# Patient Record
Sex: FEMALE | Race: WHITE | NOT HISPANIC OR LATINO | Employment: OTHER | URBAN - METROPOLITAN AREA
[De-identification: names, ages, dates, MRNs, and addresses within clinical notes are randomized per-mention and may not be internally consistent; named-entity substitution may affect disease eponyms.]

---

## 2020-12-16 ENCOUNTER — OFFICE VISIT (OUTPATIENT)
Dept: OBSTETRICS AND GYNECOLOGY | Facility: CLINIC | Age: 75
End: 2020-12-16
Attending: OBSTETRICS & GYNECOLOGY
Payer: MEDICARE

## 2020-12-16 VITALS
HEIGHT: 57 IN | DIASTOLIC BLOOD PRESSURE: 80 MMHG | BODY MASS INDEX: 22.66 KG/M2 | WEIGHT: 105.06 LBS | SYSTOLIC BLOOD PRESSURE: 120 MMHG

## 2020-12-16 DIAGNOSIS — Z00.00 ROUTINE GENERAL MEDICAL EXAMINATION AT A HEALTH CARE FACILITY: Primary | ICD-10-CM

## 2020-12-16 DIAGNOSIS — R30.0 BURNING WITH URINATION: ICD-10-CM

## 2020-12-16 DIAGNOSIS — Z12.11 ENCOUNTER FOR SCREENING COLONOSCOPY: ICD-10-CM

## 2020-12-16 DIAGNOSIS — Z01.419 ENCOUNTER FOR GYNECOLOGICAL EXAMINATION (GENERAL) (ROUTINE) WITHOUT ABNORMAL FINDINGS: ICD-10-CM

## 2020-12-16 PROCEDURE — 1126F AMNT PAIN NOTED NONE PRSNT: CPT | Mod: S$GLB,,, | Performed by: OBSTETRICS & GYNECOLOGY

## 2020-12-16 PROCEDURE — 99999 PR PBB SHADOW E&M-NEW PATIENT-LVL III: CPT | Mod: PBBFAC,,, | Performed by: OBSTETRICS & GYNECOLOGY

## 2020-12-16 PROCEDURE — 99387 PR PREVENTIVE VISIT,NEW,65 & OVER: ICD-10-PCS | Mod: S$GLB,,, | Performed by: OBSTETRICS & GYNECOLOGY

## 2020-12-16 PROCEDURE — 87086 URINE CULTURE/COLONY COUNT: CPT

## 2020-12-16 PROCEDURE — 99999 PR PBB SHADOW E&M-NEW PATIENT-LVL III: ICD-10-PCS | Mod: PBBFAC,,, | Performed by: OBSTETRICS & GYNECOLOGY

## 2020-12-16 PROCEDURE — 1126F PR PAIN SEVERITY QUANTIFIED, NO PAIN PRESENT: ICD-10-PCS | Mod: S$GLB,,, | Performed by: OBSTETRICS & GYNECOLOGY

## 2020-12-16 PROCEDURE — 3288F PR FALLS RISK ASSESSMENT DOCUMENTED: ICD-10-PCS | Mod: CPTII,S$GLB,, | Performed by: OBSTETRICS & GYNECOLOGY

## 2020-12-16 PROCEDURE — 1101F PT FALLS ASSESS-DOCD LE1/YR: CPT | Mod: CPTII,S$GLB,, | Performed by: OBSTETRICS & GYNECOLOGY

## 2020-12-16 PROCEDURE — 99387 INIT PM E/M NEW PAT 65+ YRS: CPT | Mod: S$GLB,,, | Performed by: OBSTETRICS & GYNECOLOGY

## 2020-12-16 PROCEDURE — 3288F FALL RISK ASSESSMENT DOCD: CPT | Mod: CPTII,S$GLB,, | Performed by: OBSTETRICS & GYNECOLOGY

## 2020-12-16 PROCEDURE — 1101F PR PT FALLS ASSESS DOC 0-1 FALLS W/OUT INJ PAST YR: ICD-10-PCS | Mod: CPTII,S$GLB,, | Performed by: OBSTETRICS & GYNECOLOGY

## 2020-12-17 LAB — BACTERIA UR CULT: NO GROWTH

## 2021-01-09 ENCOUNTER — IMMUNIZATION (OUTPATIENT)
Dept: INTERNAL MEDICINE | Facility: CLINIC | Age: 76
End: 2021-01-09
Payer: MEDICARE

## 2021-01-09 DIAGNOSIS — Z23 NEED FOR VACCINATION: ICD-10-CM

## 2021-01-09 PROCEDURE — 91300 COVID-19, MRNA, LNP-S, PF, 30 MCG/0.3 ML DOSE VACCINE: CPT | Mod: PBBFAC | Performed by: FAMILY MEDICINE

## 2021-01-30 ENCOUNTER — IMMUNIZATION (OUTPATIENT)
Dept: INTERNAL MEDICINE | Facility: CLINIC | Age: 76
End: 2021-01-30
Payer: MEDICARE

## 2021-01-30 DIAGNOSIS — Z23 NEED FOR VACCINATION: Primary | ICD-10-CM

## 2021-01-30 PROCEDURE — 0002A COVID-19, MRNA, LNP-S, PF, 30 MCG/0.3 ML DOSE VACCINE: CPT | Mod: PBBFAC | Performed by: FAMILY MEDICINE

## 2021-01-30 PROCEDURE — 91300 COVID-19, MRNA, LNP-S, PF, 30 MCG/0.3 ML DOSE VACCINE: CPT | Mod: PBBFAC | Performed by: FAMILY MEDICINE

## 2021-03-18 ENCOUNTER — PATIENT MESSAGE (OUTPATIENT)
Dept: RESEARCH | Facility: HOSPITAL | Age: 76
End: 2021-03-18

## 2021-03-26 ENCOUNTER — PATIENT MESSAGE (OUTPATIENT)
Dept: RESEARCH | Facility: HOSPITAL | Age: 76
End: 2021-03-26

## 2021-04-15 ENCOUNTER — TELEPHONE (OUTPATIENT)
Dept: ORTHOPEDICS | Facility: CLINIC | Age: 76
End: 2021-04-15

## 2021-04-26 ENCOUNTER — TELEPHONE (OUTPATIENT)
Dept: ORTHOPEDICS | Facility: CLINIC | Age: 76
End: 2021-04-26

## 2021-04-26 ENCOUNTER — PATIENT MESSAGE (OUTPATIENT)
Dept: ORTHOPEDICS | Facility: CLINIC | Age: 76
End: 2021-04-26

## 2021-04-26 DIAGNOSIS — M79.642 LEFT HAND PAIN: Primary | ICD-10-CM

## 2021-04-26 DIAGNOSIS — M79.641 RIGHT HAND PAIN: Primary | ICD-10-CM

## 2021-04-27 ENCOUNTER — HOSPITAL ENCOUNTER (OUTPATIENT)
Dept: RADIOLOGY | Facility: HOSPITAL | Age: 76
Discharge: HOME | End: 2021-04-27
Attending: ORTHOPAEDIC SURGERY
Payer: MEDICARE

## 2021-04-27 ENCOUNTER — OFFICE VISIT (OUTPATIENT)
Dept: ORTHOPEDICS | Facility: CLINIC | Age: 76
End: 2021-04-27
Payer: MEDICARE

## 2021-04-27 VITALS — WEIGHT: 105 LBS | HEIGHT: 57 IN | BODY MASS INDEX: 22.65 KG/M2

## 2021-04-27 DIAGNOSIS — M79.641 RIGHT HAND PAIN: ICD-10-CM

## 2021-04-27 DIAGNOSIS — M65.312 BILATERAL TRIGGER THUMB: Primary | ICD-10-CM

## 2021-04-27 DIAGNOSIS — M65.311 BILATERAL TRIGGER THUMB: Primary | ICD-10-CM

## 2021-04-27 PROCEDURE — 73130 XR HAND COMPLETE 3 VIEWS BILATERAL: ICD-10-PCS | Mod: 26,50,, | Performed by: RADIOLOGY

## 2021-04-27 PROCEDURE — 99203 PR OFFICE/OUTPT VISIT, NEW, LEVL III, 30-44 MIN: ICD-10-PCS | Mod: S$GLB,,, | Performed by: ORTHOPAEDIC SURGERY

## 2021-04-27 PROCEDURE — 1101F PR PT FALLS ASSESS DOC 0-1 FALLS W/OUT INJ PAST YR: ICD-10-PCS | Mod: CPTII,S$GLB,, | Performed by: ORTHOPAEDIC SURGERY

## 2021-04-27 PROCEDURE — 1101F PT FALLS ASSESS-DOCD LE1/YR: CPT | Mod: CPTII,S$GLB,, | Performed by: ORTHOPAEDIC SURGERY

## 2021-04-27 PROCEDURE — 99999 PR PBB SHADOW E&M-EST. PATIENT-LVL II: ICD-10-PCS | Mod: PBBFAC,,, | Performed by: ORTHOPAEDIC SURGERY

## 2021-04-27 PROCEDURE — 1125F AMNT PAIN NOTED PAIN PRSNT: CPT | Mod: S$GLB,,, | Performed by: ORTHOPAEDIC SURGERY

## 2021-04-27 PROCEDURE — 73130 X-RAY EXAM OF HAND: CPT | Mod: TC,50

## 2021-04-27 PROCEDURE — 3288F FALL RISK ASSESSMENT DOCD: CPT | Mod: CPTII,S$GLB,, | Performed by: ORTHOPAEDIC SURGERY

## 2021-04-27 PROCEDURE — 1159F MED LIST DOCD IN RCRD: CPT | Mod: S$GLB,,, | Performed by: ORTHOPAEDIC SURGERY

## 2021-04-27 PROCEDURE — 99999 PR PBB SHADOW E&M-EST. PATIENT-LVL II: CPT | Mod: PBBFAC,,, | Performed by: ORTHOPAEDIC SURGERY

## 2021-04-27 PROCEDURE — 1159F PR MEDICATION LIST DOCUMENTED IN MEDICAL RECORD: ICD-10-PCS | Mod: S$GLB,,, | Performed by: ORTHOPAEDIC SURGERY

## 2021-04-27 PROCEDURE — 1125F PR PAIN SEVERITY QUANTIFIED, PAIN PRESENT: ICD-10-PCS | Mod: S$GLB,,, | Performed by: ORTHOPAEDIC SURGERY

## 2021-04-27 PROCEDURE — 99203 OFFICE O/P NEW LOW 30 MIN: CPT | Mod: S$GLB,,, | Performed by: ORTHOPAEDIC SURGERY

## 2021-04-27 PROCEDURE — 73130 X-RAY EXAM OF HAND: CPT | Mod: 26,50,, | Performed by: RADIOLOGY

## 2021-04-27 PROCEDURE — 3288F PR FALLS RISK ASSESSMENT DOCUMENTED: ICD-10-PCS | Mod: CPTII,S$GLB,, | Performed by: ORTHOPAEDIC SURGERY

## 2024-08-19 ENCOUNTER — TELEPHONE (OUTPATIENT)
Dept: OBSTETRICS AND GYNECOLOGY | Facility: CLINIC | Age: 79
End: 2024-08-19
Payer: MEDICARE

## 2024-08-19 NOTE — TELEPHONE ENCOUNTER
----- Message from Miguelina Holguin sent at 8/19/2024  3:40 PM CDT -----  Type:  Patient Returning Call    Who Called: SELF    Who Left Message for Patient:  MEREDITH FARRELL    Does the patient know what this is regarding?:YES    Would the patient rather a call back or a response via [facility]? CALL    Best Call Back Number:[phone]      Additional Information:

## 2024-08-19 NOTE — TELEPHONE ENCOUNTER
----- Message from Miguelina Holguin sent at 8/19/2024  9:22 AM CDT -----  Type: Patient Call Back    Who called:self    What is the request in detail:scheduling new pt appt    Can the clinic reply by [facility]?no    Would the patient rather a call back or a response via [facility]? call    Best call back number:.[phone] (home)         Additional Information:

## 2024-08-27 ENCOUNTER — OFFICE VISIT (OUTPATIENT)
Dept: OBSTETRICS AND GYNECOLOGY | Facility: CLINIC | Age: 79
End: 2024-08-27
Payer: MEDICARE

## 2024-08-27 VITALS — BODY MASS INDEX: 23.07 KG/M2 | WEIGHT: 106.56 LBS

## 2024-08-27 DIAGNOSIS — N95.0 POST-MENOPAUSE BLEEDING: Primary | ICD-10-CM

## 2024-08-27 PROCEDURE — 99999 PR PBB SHADOW E&M-EST. PATIENT-LVL II: CPT | Mod: PBBFAC,,, | Performed by: OBSTETRICS & GYNECOLOGY

## 2024-08-27 PROCEDURE — 1159F MED LIST DOCD IN RCRD: CPT | Mod: CPTII,S$GLB,, | Performed by: OBSTETRICS & GYNECOLOGY

## 2024-08-27 PROCEDURE — 1101F PT FALLS ASSESS-DOCD LE1/YR: CPT | Mod: CPTII,S$GLB,, | Performed by: OBSTETRICS & GYNECOLOGY

## 2024-08-27 PROCEDURE — 3288F FALL RISK ASSESSMENT DOCD: CPT | Mod: CPTII,S$GLB,, | Performed by: OBSTETRICS & GYNECOLOGY

## 2024-08-27 PROCEDURE — 88175 CYTOPATH C/V AUTO FLUID REDO: CPT | Performed by: OBSTETRICS & GYNECOLOGY

## 2024-08-27 PROCEDURE — 1125F AMNT PAIN NOTED PAIN PRSNT: CPT | Mod: CPTII,S$GLB,, | Performed by: OBSTETRICS & GYNECOLOGY

## 2024-08-27 PROCEDURE — 1160F RVW MEDS BY RX/DR IN RCRD: CPT | Mod: CPTII,S$GLB,, | Performed by: OBSTETRICS & GYNECOLOGY

## 2024-08-27 PROCEDURE — 99204 OFFICE O/P NEW MOD 45 MIN: CPT | Mod: S$GLB,,, | Performed by: OBSTETRICS & GYNECOLOGY

## 2024-08-27 RX ORDER — LATANOPROST 50 UG/ML
SOLUTION/ DROPS OPHTHALMIC
COMMUNITY

## 2024-08-27 NOTE — PROGRESS NOTES
HPI:   79 y.o.   OB History          5    Para   1    Term   1            AB   4    Living   1         SAB   4    IAB        Ectopic        Multiple        Live Births   1              No LMP recorded. Patient is postmenopausal.    New patient, here because of vaginal bleeding recently, lasted about a week, none before or since this last week  No sig pain, but some 'pressure, creamps  Initially thought it was urine, was very light with wiping  But now feels it was vaginal,   No trauma or other new meds, no steroid shots  Had ua with primary , states was negative  No burning with urine    Sign history if bilat mastectomies for cancer,     Gi and gu good  One child    ROS:  GENERAL: No fever, chills, fatigability or weight loss.  SKIN: No rashes, itching or changes in color or texture of skin.  HEAD: No headaches or recent head trauma.  EYES: Visual acuity fine. No photophobia, ocular pain or diplopia.  EARS: Denies ear pain, discharge or vertigo.  NOSE: No loss of smell, no epistaxis or postnasal drip.  MOUTH & THROAT: No hoarseness or change in voice. No excessive gum bleeding.  NODES: Denies swollen glands.  CHEST: Denies HUSSEIN, cyanosis, wheezing, cough and sputum production.  CARDIOVASCULAR: Denies chest pain, PND, orthopnea or reduced exercise tolerance.  ABDOMEN: Appetite fine. No weight loss. Denies diarrhea, abdominal pain, hematemesis or blood in stool.  URINARY: No flank pain, dysuria or hematuria.  PERIPHERAL VASCULAR: No claudication or cyanosis.  MUSCULOSKELETAL: No joint stiffness or swelling. Denies back pain.  NEUROLOGIC: No history of seizures, paralysis, alteration of gait or coordination.    PE:   Wt 48.4 kg (106 lb 9.5 oz)   BMI 23.07 kg/m²   APPEARANCE: Well nourished, well developed, in no acute distress.  NECK: Neck symmetric without masses or thyromegaly.  BREASTS: Symmetrical, no skin changes or visible lesions. No palpable masses, nipple discharge or adenopathy  bilaterally.  ABDOMEN: Flat. Soft. No tenderness or masses. No hepatosplenomegaly. No hernias. No CVA tenderness.  VULVA: No lesions. Normal female genitalia.  URETHRAL MEATUS: Normal size and location, no lesions, no prolapse.  URETHRA: No masses, tenderness, prolapse or scarring.  VAGINA: Moist and well rugated, no discharge, no significant cystocele or rectocele.  CERVIX: No lesions and discharge. PAP done.  UTERUS: Normal size, regular shape, mobile, non-tender, bladder base nontender.  ADNEXA: No masses, tenderness or CDS nodularity.  ANUS PERINEUM: Normal.    PROCEDURES:  Pap smear    Assessment:  Post menopause bleeding, was light gone now  Normal exam  Pap done  Discussed causes of bleeding, at her age  Will order rpt ua in a few weeks, (not today because may show blood from exam)  Also pelvic ultrasound to eval uterus/endometrium  Pt agrees with plan   Mod dec making  Will fu with us and ua when comes in epic  Labs from primary revd

## 2024-08-29 LAB
CLINICAL INFO: NORMAL
DATE OF PREVIOUS PAP: NORMAL
DATE PREVIOUS BX: NO
LMP START DATE: NORMAL
SPECIMEN SOURCE CVX/VAG CYTO: NORMAL

## 2024-09-18 ENCOUNTER — HOSPITAL ENCOUNTER (OUTPATIENT)
Dept: RADIOLOGY | Facility: HOSPITAL | Age: 79
Discharge: HOME | End: 2024-09-18
Attending: OBSTETRICS & GYNECOLOGY
Payer: MEDICARE

## 2024-09-18 DIAGNOSIS — N95.0 POST-MENOPAUSE BLEEDING: ICD-10-CM

## 2024-09-18 PROCEDURE — 76856 US EXAM PELVIC COMPLETE: CPT | Mod: TC

## 2024-09-18 PROCEDURE — 76830 TRANSVAGINAL US NON-OB: CPT | Mod: 26,,, | Performed by: RADIOLOGY

## 2024-09-18 PROCEDURE — 76830 TRANSVAGINAL US NON-OB: CPT | Mod: TC

## 2024-09-18 PROCEDURE — 76856 US EXAM PELVIC COMPLETE: CPT | Mod: 26,,, | Performed by: RADIOLOGY

## 2024-09-19 ENCOUNTER — PATIENT MESSAGE (OUTPATIENT)
Dept: OBSTETRICS AND GYNECOLOGY | Facility: CLINIC | Age: 79
End: 2024-09-19
Payer: MEDICARE

## 2024-09-19 ENCOUNTER — TELEPHONE (OUTPATIENT)
Dept: OBSTETRICS AND GYNECOLOGY | Facility: CLINIC | Age: 79
End: 2024-09-19
Payer: MEDICARE

## 2024-09-19 NOTE — TELEPHONE ENCOUNTER
----- Message from Julieta De Leon sent at 9/19/2024  3:57 PM CDT -----  Regarding: return call  Contact: patient  Type:  Patient Returning Call    Who Called:  patient  Who Left Message for Patient:  Nallely  Does the patient know what this is regarding?:    Best Call Back Number:  [phone] (home)     Additional Information:  Please call patient to advise.  Thanks!

## 2024-09-30 ENCOUNTER — TELEPHONE (OUTPATIENT)
Dept: OBSTETRICS AND GYNECOLOGY | Facility: CLINIC | Age: 79
End: 2024-09-30
Payer: MEDICARE

## 2024-09-30 ENCOUNTER — PROCEDURE VISIT (OUTPATIENT)
Dept: OBSTETRICS AND GYNECOLOGY | Facility: CLINIC | Age: 79
End: 2024-09-30
Payer: MEDICARE

## 2024-09-30 VITALS
BODY MASS INDEX: 23.18 KG/M2 | DIASTOLIC BLOOD PRESSURE: 85 MMHG | SYSTOLIC BLOOD PRESSURE: 134 MMHG | WEIGHT: 107.13 LBS

## 2024-09-30 DIAGNOSIS — N95.0 POST-MENOPAUSAL BLEEDING: Primary | ICD-10-CM

## 2024-09-30 PROCEDURE — 58100 BIOPSY OF UTERUS LINING: CPT | Mod: S$GLB,,, | Performed by: OBSTETRICS & GYNECOLOGY

## 2024-09-30 PROCEDURE — 88305 TISSUE EXAM BY PATHOLOGIST: CPT | Performed by: PATHOLOGY

## 2024-09-30 PROCEDURE — 88341 IMHCHEM/IMCYTCHM EA ADD ANTB: CPT | Performed by: PATHOLOGY

## 2024-09-30 PROCEDURE — 88342 IMHCHEM/IMCYTCHM 1ST ANTB: CPT | Performed by: PATHOLOGY

## 2024-09-30 PROCEDURE — 99499 UNLISTED E&M SERVICE: CPT | Mod: S$GLB,,, | Performed by: OBSTETRICS & GYNECOLOGY

## 2024-09-30 NOTE — TELEPHONE ENCOUNTER
----- Message from Maninder sent at 9/30/2024  8:33 AM CDT -----  .Type:  Needs Medical Advice    Who Called: pt    Would the patient rather a call back or a response via [facility]? Call back  Best Call Back Number: [phone]  Additional Information:     Pt stated she received a call to come in today at 1 pm to see the doctor and its not on her appt schedule and would like a call back to verify

## 2024-09-30 NOTE — PROCEDURES
Endometrial biopsy    Date/Time: 9/30/2024 1:00 PM    Performed by: Wiedemann, Michael A., MD  Authorized by: Wiedemann, Michael A., MD    Consent:     Consent obtained:  Prior to procedure the appropriate consent was completed and verified    Consent given by:  Patient    Patient questions answered: yes      Patient agrees, verbalizes understanding, and wants to proceed: yes      Educational handouts given: no      Instructions and paperwork completed: yes    Indication:     Indications: Post-menopausal bleeding      Chronicity of post-menopausal bleeding:  Recurrent    Progression of post-menopausal bleeding:  Unchanged  Pre-procedure:     Pre-procedure timeout performed: yes    Procedure:     Procedure: endometrial biopsy with Pipelle      Cervix cleaned and prepped: yes      A paracervical block was performed: no      An intracervical block was performed: no      The cervix was dilated: yes      Uterus sounded: yes      Uterus sound depth (cm):  7    Specimen collected: specimen collected and sent to pathology      Patient tolerated procedure well with no complications: yes    Comments:     Procedure comments:  Discussed need for embx  Discussed causes  Consents done  Cx very small os, was able to dilate, sound to 6-7  2 passes with suction pipelle done, very good sample  Damien well

## 2024-10-03 ENCOUNTER — PATIENT MESSAGE (OUTPATIENT)
Dept: OBSTETRICS AND GYNECOLOGY | Facility: CLINIC | Age: 79
End: 2024-10-03
Payer: MEDICARE

## 2024-10-03 ENCOUNTER — TELEPHONE (OUTPATIENT)
Dept: GYNECOLOGIC ONCOLOGY | Facility: CLINIC | Age: 79
End: 2024-10-03
Payer: MEDICARE

## 2024-10-03 LAB
FINAL PATHOLOGIC DIAGNOSIS: NORMAL
GROSS: NORMAL
Lab: NORMAL

## 2024-10-03 NOTE — TELEPHONE ENCOUNTER
Discussed emb results, shows malignancy  Disc  refer to gyn onc  Expect a call  Pt voices understanding

## 2024-10-04 ENCOUNTER — PATIENT MESSAGE (OUTPATIENT)
Dept: OBSTETRICS AND GYNECOLOGY | Facility: CLINIC | Age: 79
End: 2024-10-04
Payer: MEDICARE

## 2024-10-04 ENCOUNTER — TELEPHONE (OUTPATIENT)
Dept: OBSTETRICS AND GYNECOLOGY | Facility: CLINIC | Age: 79
End: 2024-10-04
Payer: MEDICARE

## 2024-10-04 NOTE — TELEPHONE ENCOUNTER
----- Message from Sandra sent at 10/4/2024 11:15 AM CDT -----  Type: General Call Back     Name of Caller:LEROY AYLEEN [[id number]]    Reason for call back?:referral     Best Call Back Number:[phone]    Additional Information: patient states she was referred by the provider to dr. Quiroz. Patient states she was being referred to three different providers but never got the information of the other two. Patient states she would like to get the information of the two other providers if possible as she would like to look into them as well if needed. Please message through [facility] portal with further assistance.

## 2024-10-07 ENCOUNTER — TELEPHONE (OUTPATIENT)
Dept: OBSTETRICS AND GYNECOLOGY | Facility: CLINIC | Age: 79
End: 2024-10-07
Payer: MEDICARE

## 2024-10-10 ENCOUNTER — TELEPHONE (OUTPATIENT)
Dept: GYNECOLOGIC ONCOLOGY | Facility: CLINIC | Age: 79
End: 2024-10-10

## 2024-10-10 ENCOUNTER — LAB VISIT (OUTPATIENT)
Dept: LAB | Facility: HOSPITAL | Age: 79
End: 2024-10-10
Attending: STUDENT IN AN ORGANIZED HEALTH CARE EDUCATION/TRAINING PROGRAM
Payer: MEDICARE

## 2024-10-10 ENCOUNTER — OFFICE VISIT (OUTPATIENT)
Dept: GYNECOLOGIC ONCOLOGY | Facility: CLINIC | Age: 79
End: 2024-10-10
Payer: MEDICARE

## 2024-10-10 VITALS
SYSTOLIC BLOOD PRESSURE: 143 MMHG | HEART RATE: 77 BPM | WEIGHT: 105.81 LBS | DIASTOLIC BLOOD PRESSURE: 76 MMHG | OXYGEN SATURATION: 98 % | TEMPERATURE: 98 F | HEIGHT: 58 IN | BODY MASS INDEX: 22.21 KG/M2

## 2024-10-10 DIAGNOSIS — N95.0 PMB (POSTMENOPAUSAL BLEEDING): ICD-10-CM

## 2024-10-10 DIAGNOSIS — C54.1 ENDOMETRIAL CANCER: Primary | ICD-10-CM

## 2024-10-10 DIAGNOSIS — C54.1 ENDOMETRIAL CANCER DETERMINED BY UTERINE BIOPSY: ICD-10-CM

## 2024-10-10 DIAGNOSIS — R10.9 ABDOMINAL PAIN: ICD-10-CM

## 2024-10-10 LAB
CANCER AG125 SERPL-ACNC: 21 U/ML (ref 0–30)
CREAT SERPL-MCNC: 0.8 MG/DL (ref 0.5–1.4)
EST. GFR  (NO RACE VARIABLE): >60 ML/MIN/1.73 M^2

## 2024-10-10 PROCEDURE — 86304 IMMUNOASSAY TUMOR CA 125: CPT | Performed by: STUDENT IN AN ORGANIZED HEALTH CARE EDUCATION/TRAINING PROGRAM

## 2024-10-10 PROCEDURE — 82565 ASSAY OF CREATININE: CPT | Performed by: STUDENT IN AN ORGANIZED HEALTH CARE EDUCATION/TRAINING PROGRAM

## 2024-10-10 PROCEDURE — 36415 COLL VENOUS BLD VENIPUNCTURE: CPT | Performed by: STUDENT IN AN ORGANIZED HEALTH CARE EDUCATION/TRAINING PROGRAM

## 2024-10-10 PROCEDURE — 99999 PR PBB SHADOW E&M-EST. PATIENT-LVL III: CPT | Mod: PBBFAC,,, | Performed by: STUDENT IN AN ORGANIZED HEALTH CARE EDUCATION/TRAINING PROGRAM

## 2024-10-10 RX ORDER — LIDOCAINE HYDROCHLORIDE 10 MG/ML
1 INJECTION, SOLUTION EPIDURAL; INFILTRATION; INTRACAUDAL; PERINEURAL ONCE
OUTPATIENT
Start: 2024-10-10 | End: 2024-10-10

## 2024-10-10 RX ORDER — HEPARIN SODIUM 5000 [USP'U]/ML
5000 INJECTION, SOLUTION INTRAVENOUS; SUBCUTANEOUS
OUTPATIENT
Start: 2024-10-10 | End: 2024-10-11

## 2024-10-10 NOTE — Clinical Note
Sharon Harris is such a sweet lady. Thanks for sending her to see me. We are planning for surgery in 2 weeks.  Thanks, Bob

## 2024-10-10 NOTE — H&P (VIEW-ONLY)
Referring Provider:  Wiedemann, Michael A., MD  [address] [place] [address]  [place] [address]   Subjective:      Patient ID: Sharon Garcia is a 79 y.o. female.    Chief Complaint: Advice Only (Consult, endometrial cancer)    Problem List Items Addressed This Visit    None  Visit Diagnoses       Endometrial cancer determined by uterine biopsy    -  Primary    Relevant Orders    CANCER ANTIGEN 125    CT Chest Abdomen Pelvis W W/O Contrast (XPD)    Creatinine, serum           HPI Several months of PMB. 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 Here to discuss treatment options.    Review of Systems   Constitutional:  Negative for chills, fatigue and fever.   Respiratory:  Negative for cough and shortness of breath.    Cardiovascular:  Negative for chest pain.   Gastrointestinal:  Negative for abdominal distention, abdominal pain, constipation and diarrhea.   Genitourinary:  Positive for vaginal bleeding. Negative for dysuria and pelvic pain.   Musculoskeletal:  Negative for back pain.   Psychiatric/Behavioral:  Negative for dysphoric mood. The patient is not nervous/anxious.      Past Medical History:   Diagnosis Date    Breast cancer     '91 Left, '93 right; alicia mastectomy, no chemo or radiation; no genetic testing    Osteoporosis       Past Surgical History:   Procedure Laterality Date    MASTECTOMY        Family History   Problem Relation Name Age of Onset    Hypertension Mother      Other (sarcoma) Sister      Ovarian cancer Neg Hx      Uterine cancer Neg Hx      Breast cancer Neg Hx      Colon cancer Neg Hx        Social History     Socioeconomic History    Marital status:         Objective:      Vitals:    10/10/24 0928   BP: (!) 143/76   Pulse: 77   Temp: 98.3 °F (36.8 °C)      Physical Exam  Constitutional:       General: She is not in acute distress.  HENT:      Head: Normocephalic.   Eyes:      Extraocular Movements: Extraocular movements intact.      Conjunctiva/sclera: Conjunctivae normal.  "  Cardiovascular:      Rate and Rhythm: Normal rate.      Pulses: Normal pulses.   Pulmonary:      Effort: Pulmonary effort is normal. No respiratory distress.      Breath sounds: No wheezing.   Abdominal:      General: There is no distension.      Tenderness: There is no abdominal tenderness. There is no guarding or rebound.   Genitourinary:     Comments: External genitalia normal. Vagina normal. Cervix with no visible lesions. Uterus mobile and small.  A female staff member was present for the exam    Musculoskeletal:         General: No deformity.   Neurological:      Mental Status: She is alert and oriented to person, place, and time.   Psychiatric:         Mood and Affect: Mood normal.         Behavior: Behavior normal.         Thought Content: Thought content normal.         No results found for: "WBC", "HGB", "HCT", "MCV", "PLT"     Assessment:       Endometrial cancer determined by uterine biopsy  -     CANCER ANTIGEN 125; Future; Expected date: 10/17/2024  -     CT Chest Abdomen Pelvis W W/O Contrast (XPD); Future; Expected date: 10/17/2024  -     Creatinine, serum; Future; Expected date: 10/17/2024         Plan:       Endometrial cancer: Prior work up showed serous endometrial cancer. I had an extensive conversation with the patient today giving a broad overview of endometrial cancer to include epidemiology, risk factors, clinical features, diagnosis, as well as surgical treatment.  I have recommended robotic-assisted hysterectomy, bilateral salpingo-oophorectomy and sentinel lymph node mapping and biopsy. Discussed role of systematic lymphadenectomy if no mapping.  Based on the data from surgery we will determine whether adjuvant therapy would be recommended. I discussed extensively the risks, benefits, alternatives, and indications of the planned procedure to include the risk of damage to bowel, bladder, ureter, or any other abdominal or pelvic organ as well as the risks of conversion to a laparotomy. " Additionally, she understands the surgical risks of infection, allergic reaction, bleeding possibly severe enough to require blood transfusion, blood clots, and cardiopulmonary complications.  She expresses understanding, was given an opportunity to ask questions. After all questions had been answered she strongly desires to proceed with planned procedure.  Plan for Robotic hysterectomy, BSO, and SLN biopsy  CT CA/P and CA-125  prior to surgery  Return post op for discussion of adjuvant treatment.    As part of the medical decision making process I reviewed the referring provides notes, relevant labs, imaging reports and independently interpreted theTVUS from 9/18/24.    Visit today is associated with current or anticipated ongoing medical care related to this patient's single serious condition/complex condition: endometrial cancer.         Bob Quiroz MD

## 2024-10-10 NOTE — PROGRESS NOTES
Referring Provider:  Wiedemann, Michael A., MD  [address] [place] [address]  [place] [address]   Subjective:      Patient ID: Sharon Garcia is a 79 y.o. female.    Chief Complaint: Advice Only (Consult, endometrial cancer)    Problem List Items Addressed This Visit    None  Visit Diagnoses       Endometrial cancer determined by uterine biopsy    -  Primary    Relevant Orders    CANCER ANTIGEN 125    CT Chest Abdomen Pelvis W W/O Contrast (XPD)    Creatinine, serum           HPI Several months of PMB. 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 Here to discuss treatment options.    Review of Systems   Constitutional:  Negative for chills, fatigue and fever.   Respiratory:  Negative for cough and shortness of breath.    Cardiovascular:  Negative for chest pain.   Gastrointestinal:  Negative for abdominal distention, abdominal pain, constipation and diarrhea.   Genitourinary:  Positive for vaginal bleeding. Negative for dysuria and pelvic pain.   Musculoskeletal:  Negative for back pain.   Psychiatric/Behavioral:  Negative for dysphoric mood. The patient is not nervous/anxious.      Past Medical History:   Diagnosis Date    Breast cancer     '91 Left, '93 right; alicia mastectomy, no chemo or radiation; no genetic testing    Osteoporosis       Past Surgical History:   Procedure Laterality Date    MASTECTOMY        Family History   Problem Relation Name Age of Onset    Hypertension Mother      Other (sarcoma) Sister      Ovarian cancer Neg Hx      Uterine cancer Neg Hx      Breast cancer Neg Hx      Colon cancer Neg Hx        Social History     Socioeconomic History    Marital status:         Objective:      Vitals:    10/10/24 0928   BP: (!) 143/76   Pulse: 77   Temp: 98.3 °F (36.8 °C)      Physical Exam  Constitutional:       General: She is not in acute distress.  HENT:      Head: Normocephalic.   Eyes:      Extraocular Movements: Extraocular movements intact.      Conjunctiva/sclera: Conjunctivae normal.  "  Cardiovascular:      Rate and Rhythm: Normal rate.      Pulses: Normal pulses.   Pulmonary:      Effort: Pulmonary effort is normal. No respiratory distress.      Breath sounds: No wheezing.   Abdominal:      General: There is no distension.      Tenderness: There is no abdominal tenderness. There is no guarding or rebound.   Genitourinary:     Comments: External genitalia normal. Vagina normal. Cervix with no visible lesions. Uterus mobile and small.  A female staff member was present for the exam    Musculoskeletal:         General: No deformity.   Neurological:      Mental Status: She is alert and oriented to person, place, and time.   Psychiatric:         Mood and Affect: Mood normal.         Behavior: Behavior normal.         Thought Content: Thought content normal.         No results found for: "WBC", "HGB", "HCT", "MCV", "PLT"     Assessment:       Endometrial cancer determined by uterine biopsy  -     CANCER ANTIGEN 125; Future; Expected date: 10/17/2024  -     CT Chest Abdomen Pelvis W W/O Contrast (XPD); Future; Expected date: 10/17/2024  -     Creatinine, serum; Future; Expected date: 10/17/2024         Plan:       Endometrial cancer: Prior work up showed serous endometrial cancer. I had an extensive conversation with the patient today giving a broad overview of endometrial cancer to include epidemiology, risk factors, clinical features, diagnosis, as well as surgical treatment.  I have recommended robotic-assisted hysterectomy, bilateral salpingo-oophorectomy and sentinel lymph node mapping and biopsy. Discussed role of systematic lymphadenectomy if no mapping.  Based on the data from surgery we will determine whether adjuvant therapy would be recommended. I discussed extensively the risks, benefits, alternatives, and indications of the planned procedure to include the risk of damage to bowel, bladder, ureter, or any other abdominal or pelvic organ as well as the risks of conversion to a laparotomy. " Additionally, she understands the surgical risks of infection, allergic reaction, bleeding possibly severe enough to require blood transfusion, blood clots, and cardiopulmonary complications.  She expresses understanding, was given an opportunity to ask questions. After all questions had been answered she strongly desires to proceed with planned procedure.  Plan for Robotic hysterectomy, BSO, and SLN biopsy  CT CA/P and CA-125  prior to surgery  Return post op for discussion of adjuvant treatment.    As part of the medical decision making process I reviewed the referring provides notes, relevant labs, imaging reports and independently interpreted theTVUS from 9/18/24.    Visit today is associated with current or anticipated ongoing medical care related to this patient's single serious condition/complex condition: endometrial cancer.         Bob Quiroz MD

## 2024-10-11 ENCOUNTER — HOSPITAL ENCOUNTER (OUTPATIENT)
Dept: RADIOLOGY | Facility: HOSPITAL | Age: 79
Discharge: HOME | End: 2024-10-11
Attending: STUDENT IN AN ORGANIZED HEALTH CARE EDUCATION/TRAINING PROGRAM
Payer: MEDICARE

## 2024-10-11 DIAGNOSIS — C54.1 ENDOMETRIAL CANCER DETERMINED BY UTERINE BIOPSY: ICD-10-CM

## 2024-10-11 PROCEDURE — A9698 NON-RAD CONTRAST MATERIALNOC: HCPCS | Performed by: STUDENT IN AN ORGANIZED HEALTH CARE EDUCATION/TRAINING PROGRAM

## 2024-10-11 PROCEDURE — 71270 CT THORAX DX C-/C+: CPT | Mod: TC

## 2024-10-11 PROCEDURE — 71270 CT THORAX DX C-/C+: CPT | Mod: 26,,, | Performed by: RADIOLOGY

## 2024-10-11 PROCEDURE — 74178 CT ABD&PLV WO CNTR FLWD CNTR: CPT | Mod: 26,,, | Performed by: RADIOLOGY

## 2024-10-11 PROCEDURE — 25500020 PHARM REV CODE 255: Performed by: STUDENT IN AN ORGANIZED HEALTH CARE EDUCATION/TRAINING PROGRAM

## 2024-10-11 PROCEDURE — 74178 CT ABD&PLV WO CNTR FLWD CNTR: CPT | Mod: TC

## 2024-10-11 RX ADMIN — IOHEXOL 75 ML: 350 INJECTION, SOLUTION INTRAVENOUS at 01:10

## 2024-10-11 RX ADMIN — IOHEXOL 1000 ML: 12 SOLUTION ORAL at 12:10

## 2024-10-16 ENCOUNTER — HOSPITAL ENCOUNTER (OUTPATIENT)
Dept: PREADMISSION TESTING | Facility: OTHER | Age: 79
Discharge: HOME | End: 2024-10-16
Attending: STUDENT IN AN ORGANIZED HEALTH CARE EDUCATION/TRAINING PROGRAM
Payer: MEDICARE

## 2024-10-16 ENCOUNTER — ANESTHESIA EVENT (OUTPATIENT)
Dept: SURGERY | Facility: OTHER | Age: 79
End: 2024-10-16
Payer: MEDICARE

## 2024-10-16 VITALS
OXYGEN SATURATION: 97 % | HEIGHT: 58 IN | TEMPERATURE: 98 F | RESPIRATION RATE: 16 BRPM | SYSTOLIC BLOOD PRESSURE: 150 MMHG | HEART RATE: 77 BPM | DIASTOLIC BLOOD PRESSURE: 69 MMHG | BODY MASS INDEX: 22.04 KG/M2 | WEIGHT: 105 LBS

## 2024-10-16 DIAGNOSIS — Z01.818 PREOP TESTING: Primary | ICD-10-CM

## 2024-10-16 LAB
ABO + RH BLD: NORMAL
ANION GAP SERPL CALC-SCNC: 10 MMOL/L (ref 8–16)
BASOPHILS # BLD AUTO: 0.03 K/UL (ref 0–0.2)
BASOPHILS NFR BLD: 0.5 % (ref 0–1.9)
BLD GP AB SCN CELLS X3 SERPL QL: NORMAL
BUN SERPL-MCNC: 12 MG/DL (ref 8–23)
CALCIUM SERPL-MCNC: 9.4 MG/DL (ref 8.7–10.5)
CHLORIDE SERPL-SCNC: 105 MMOL/L (ref 95–110)
CO2 SERPL-SCNC: 28 MMOL/L (ref 23–29)
CREAT SERPL-MCNC: 0.8 MG/DL (ref 0.5–1.4)
DIFFERENTIAL METHOD BLD: NORMAL
EOSINOPHIL # BLD AUTO: 0.2 K/UL (ref 0–0.5)
EOSINOPHIL NFR BLD: 3.5 % (ref 0–8)
ERYTHROCYTE [DISTWIDTH] IN BLOOD BY AUTOMATED COUNT: 13.1 % (ref 11.5–14.5)
EST. GFR  (NO RACE VARIABLE): >60 ML/MIN/1.73 M^2
GLUCOSE SERPL-MCNC: 78 MG/DL (ref 70–110)
HCT VFR BLD AUTO: 43.2 % (ref 37–48.5)
HGB BLD-MCNC: 14.3 G/DL (ref 12–16)
IMM GRANULOCYTES # BLD AUTO: 0.01 K/UL (ref 0–0.04)
IMM GRANULOCYTES NFR BLD AUTO: 0.2 % (ref 0–0.5)
LYMPHOCYTES # BLD AUTO: 1.2 K/UL (ref 1–4.8)
LYMPHOCYTES NFR BLD: 20.8 % (ref 18–48)
MCH RBC QN AUTO: 30.6 PG (ref 27–31)
MCHC RBC AUTO-ENTMCNC: 33.1 G/DL (ref 32–36)
MCV RBC AUTO: 92 FL (ref 82–98)
MONOCYTES # BLD AUTO: 0.6 K/UL (ref 0.3–1)
MONOCYTES NFR BLD: 10.5 % (ref 4–15)
NEUTROPHILS # BLD AUTO: 3.7 K/UL (ref 1.8–7.7)
NEUTROPHILS NFR BLD: 64.5 % (ref 38–73)
NRBC BLD-RTO: 0 /100 WBC
PLATELET # BLD AUTO: 226 K/UL (ref 150–450)
PMV BLD AUTO: 11.1 FL (ref 9.2–12.9)
POTASSIUM SERPL-SCNC: 4.6 MMOL/L (ref 3.5–5.1)
RBC # BLD AUTO: 4.68 M/UL (ref 4–5.4)
SODIUM SERPL-SCNC: 143 MMOL/L (ref 136–145)
SPECIMEN OUTDATE: NORMAL
WBC # BLD AUTO: 5.73 K/UL (ref 3.9–12.7)

## 2024-10-16 PROCEDURE — 36415 COLL VENOUS BLD VENIPUNCTURE: CPT | Performed by: ANESTHESIOLOGY

## 2024-10-16 PROCEDURE — 93005 ELECTROCARDIOGRAM TRACING: CPT

## 2024-10-16 PROCEDURE — 80048 BASIC METABOLIC PNL TOTAL CA: CPT | Performed by: ANESTHESIOLOGY

## 2024-10-16 PROCEDURE — 86900 BLOOD TYPING SEROLOGIC ABO: CPT | Performed by: ANESTHESIOLOGY

## 2024-10-16 PROCEDURE — 86901 BLOOD TYPING SEROLOGIC RH(D): CPT | Performed by: ANESTHESIOLOGY

## 2024-10-16 PROCEDURE — 93010 ELECTROCARDIOGRAM REPORT: CPT | Mod: ,,, | Performed by: INTERNAL MEDICINE

## 2024-10-16 PROCEDURE — 85025 COMPLETE CBC W/AUTO DIFF WBC: CPT | Performed by: ANESTHESIOLOGY

## 2024-10-16 RX ORDER — SODIUM CHLORIDE, SODIUM LACTATE, POTASSIUM CHLORIDE, CALCIUM CHLORIDE 600; 310; 30; 20 MG/100ML; MG/100ML; MG/100ML; MG/100ML
INJECTION, SOLUTION INTRAVENOUS CONTINUOUS
OUTPATIENT
Start: 2024-10-16

## 2024-10-16 RX ORDER — LIDOCAINE HYDROCHLORIDE 10 MG/ML
0.5 INJECTION, SOLUTION EPIDURAL; INFILTRATION; INTRACAUDAL; PERINEURAL ONCE
OUTPATIENT
Start: 2024-10-16 | End: 2024-10-16

## 2024-10-16 RX ORDER — BUTALB/ACETAMINOPHEN/CAFFEINE 50-325-40
1 TABLET ORAL 2 TIMES DAILY
COMMUNITY

## 2024-10-16 RX ORDER — ZINC GLUCONATE 50 MG
50 TABLET ORAL DAILY
COMMUNITY

## 2024-10-16 NOTE — DISCHARGE INSTRUCTIONS
Information to Prepare you for your Surgery    PRE-ADMIT TESTING   [address]  [place] [address]  [address]   [phone]  - [place]IC[place]    Your surgery has been scheduled at [facility]. We are pleased to have the opportunity to serve you. For Further Information please call [phone].    On the day of surgery please report to Registration on the 1st floor of the [facility].    CONTACT YOUR PHYSICIAN'S OFFICE THE DAY PRIOR TO YOUR SURGERY TO OBTAIN YOUR ARRIVAL TIME.     The evening before surgery do not eat anything after 9 p.m. ( this includes hard candy, chewing gum and mints).  You may only have GATORADE, POWERADE AND WATER  from 9 p.m. until you leave your home.   *DRINK AT LEAST 12 OUNCES THE MORNING OF SURGERY    DO NOT DRINK ANY LIQUIDS ON THE WAY TO THE HOSPITAL.      Why does your anesthesiologist allow you to drink Gatorade/Powerade before surgery?  Gatorade/Powerade helps to increase your comfort before surgery and to decrease your nausea after surgery.   The carbohydrates in Gatorade/Powerade help reduce your body's stress response to surgery.  If you are a diabetic-drink only water prior to surgery.    Outpatient Surgery- May allow 2 adults (18 and older)/ Support Persons (1 being the designated ) for all surgical/procedural patients. A breastfeeding mother will be allowed her infant and 2 adult Support Persons. No one under the age of 18 will be allowed in the building.    MEDICATION INSTRUCTIONS: TAKE medications checked off by the Anesthesiologist on your Medication List.  *Please bring blood pressure medication and diabetes medication in their original bottles the day of surgery.    Angiogram Patients: Take medications as instructed by your physician, including aspirin.     Surgery Patients:  If you take ASPIRIN - Your PHYSICIAN/SURGEON will need to inform you IF/OR when you need to stop taking aspirin prior to your surgery.      Starting the week prior to surgery, do not take any medications containing IBUPROFEN or NSAIDS (Advil, Aleve, BC, Celebrex, Goody's, Ketorolac, Meloxicam, Mobic, Motrin, Naproxen, Toradol, etc).  If you are not sure if you should take a medicine please call your surgeon's office.  You may take Tylenol.    Do Not Wear any make-up (especially eye make-up) to surgery. Please remove any false eyelashes or eyelash extensions. If you arrive the day of surgery with makeup/eyelashes on you will be required to remove prior to surgery. (There is a risk of corneal abrasions if eye makeup/eyelash extensions are not removed)    Leave all valuables at home.   Do Not wear any jewelry or watches, including any metal in body piercings. Jewelry must be removed prior to coming to the hospital.  There is a possibility that rings that are unable to be removed may be cut off if they are on the surgical extremity.    Please remove all hair extensions, wigs, clips and any other metal accessories/ ornaments from your hair.  These items may pose a flammable/fire risk in Surgery and must be removed.    Do not shave your surgical area at least 5 days prior to your surgery. The surgical prep will be performed at the hospital according to Infection Control regulations.    Contact Lens must be removed before surgery. Either do not wear the contact lens or bring a case and solution for storage.  Please bring a container for eyeglasses or dentures as required.  Bring any paperwork your physician has provided, such as consent forms,  history and physicals, doctor's orders, etc.   Bring comfortable clothes that are loose fitting to wear upon discharge. Take into consideration the type of surgery being performed.  Maintain your diet as advised per your physician the day prior to surgery.    Adequate rest the night before surgery is advised.   Park in the Parking lot behind the hospital or in the [employer] [facility] across the street from the  parking lot. Parking is complimentary.  If you will be discharged the same day as your procedure, please arrange for a responsible adult to drive you home or to accompany you if traveling by taxi.   YOU WILL NOT BE PERMITTED TO DRIVE OR TO LEAVE THE HOSPITAL ALONE AFTER SURGERY.   If you are being discharged the same day, it is strongly recommended that you arrange for someone to remain with you for the first 24 hrs following your surgery.    The Surgeon will speak to your family/visitor after your surgery regarding the outcome of your surgery and post op care.  The Surgeon may speak to you after your surgery, but there is a possibility you may not remember the details.  Please check with your family members regarding the conversation with the Surgeon.         Bathing Instructions with Hibiclens:    Shower the evening before and morning of your procedure with Chlorhexidine (Hibiclens)  do not use Chlorhexidine on your face or genitals. Do not get in your eyes.  Wash your face with water and your regular face wash/soap  Use your regular shampoo  Apply Chlorhexidine (Hibiclens) directly on your skin or on a wet washcloth and wash gently. When showering: Move away from the shower stream when applying Chlorhexidine (Hibiclens) to avoid rinsing off too soon.  Rinse thoroughly with warm water  Do not dilute Chlorhexidine (Hibiclens)   Dry off as usual, do not use any deodorant, powder, body lotions, perfume, after shave or cologne.     We strongly recommend whoever is bringing you home be present for discharge instructions.  This will ensure a thorough understanding for your post op home care.      ThanksCarly RN

## 2024-10-16 NOTE — ANESTHESIA PREPROCEDURE EVALUATION
10/16/2024  Sharon Garcia is a 79 y.o., female.      Pre-op Assessment    I have reviewed the Patient Summary Reports.     I have reviewed the Nursing Notes. I have reviewed the NPO Status.   I have reviewed the Medications.     Review of Systems  Anesthesia Hx:  No previous Anesthesia             Denies Family Hx of Anesthesia complications.    Denies Personal Hx of Anesthesia complications.                    Social:  Non-Smoker       Hematology/Oncology:  Hematology Normal                     Current/Recent Cancer.  --  Cancer in past history:       Breast          Oncology Comments: Endometrial cancer now  Prev alicia mast for breast ca     EENT/Dental:  EENT/Dental Normal           Cardiovascular:  Cardiovascular Normal                                              Pulmonary:  Pulmonary Normal                       Renal/:  Renal/ Normal                 Hepatic/GI:  Hepatic/GI Normal                    Musculoskeletal:  Arthritis               Neurological:  Neurology Normal                                      Endocrine:  Endocrine Normal            Dermatological:  Skin Normal    Psych:  Psychiatric Normal                  Physical Exam  General: Cooperative, Alert and Oriented    Airway:  Mallampati: II   Mouth Opening: Normal  Neck ROM: Normal ROM    Dental:  Retainer, Caps / Implants, Intact    Anesthesia Plan  Type of Anesthesia, risks & benefits discussed:    Anesthesia Type: Gen ETT  Intra-op Monitoring Plan: Standard ASA Monitors  Post Op Pain Control Plan: multimodal analgesia  Induction:  IV  Airway Plan: Video  Informed Consent: Informed consent signed with the Patient and all parties understand the risks and agree with anesthesia plan.  All questions answered.   ASA Score: 2  Anesthesia Plan Notes: Labs,EKG, T and S today    Ready For Surgery From Anesthesia Perspective.     .

## 2024-10-17 LAB
OHS QRS DURATION: 76 MS
OHS QTC CALCULATION: 410 MS

## 2024-10-21 ENCOUNTER — TELEPHONE (OUTPATIENT)
Dept: GYNECOLOGIC ONCOLOGY | Facility: CLINIC | Age: 79
End: 2024-10-21
Payer: MEDICARE

## 2024-10-22 ENCOUNTER — TELEPHONE (OUTPATIENT)
Dept: GYNECOLOGIC ONCOLOGY | Facility: CLINIC | Age: 79
End: 2024-10-22
Payer: MEDICARE

## 2024-10-22 NOTE — TELEPHONE ENCOUNTER
----- Message from Keyonna sent at 10/22/2024  2:21 PM CDT -----  Contact: patient  Type:  Patient Call          Who Called: Patient         Does the patient know what this is regarding?: Requesting a call back pt would like to know what time her surgery is scheduled for ;she knows that she should arrive at 6 ; please advise           Would the patient rather a call back or a response via [email or web address]?call           Best Call Back Number: .phone            Additional Information:

## 2024-10-23 ENCOUNTER — HOSPITAL ENCOUNTER (OUTPATIENT)
Facility: OTHER | Age: 79
LOS: 1 days | Discharge: HOME | End: 2024-10-23
Attending: STUDENT IN AN ORGANIZED HEALTH CARE EDUCATION/TRAINING PROGRAM | Admitting: STUDENT IN AN ORGANIZED HEALTH CARE EDUCATION/TRAINING PROGRAM
Payer: MEDICARE

## 2024-10-23 ENCOUNTER — ANESTHESIA (OUTPATIENT)
Dept: SURGERY | Facility: OTHER | Age: 79
End: 2024-10-23
Payer: MEDICARE

## 2024-10-23 VITALS
BODY MASS INDEX: 22.04 KG/M2 | OXYGEN SATURATION: 94 % | HEIGHT: 58 IN | WEIGHT: 105 LBS | TEMPERATURE: 99 F | RESPIRATION RATE: 18 BRPM | HEART RATE: 93 BPM | SYSTOLIC BLOOD PRESSURE: 113 MMHG | DIASTOLIC BLOOD PRESSURE: 65 MMHG

## 2024-10-23 DIAGNOSIS — Z90.710 S/P LAPAROSCOPIC HYSTERECTOMY: Primary | ICD-10-CM

## 2024-10-23 DIAGNOSIS — C54.1 ENDOMETRIAL CANCER: ICD-10-CM

## 2024-10-23 DIAGNOSIS — C54.1 ENDOMETRIAL CANCER DETERMINED BY UTERINE BIOPSY: ICD-10-CM

## 2024-10-23 LAB — POCT GLUCOSE: 77 MG/DL (ref 70–110)

## 2024-10-23 PROCEDURE — 36000712 HC OR TIME LEV V 1ST 15 MIN: Performed by: STUDENT IN AN ORGANIZED HEALTH CARE EDUCATION/TRAINING PROGRAM

## 2024-10-23 PROCEDURE — 88305 TISSUE EXAM BY PATHOLOGIST: CPT | Performed by: STUDENT IN AN ORGANIZED HEALTH CARE EDUCATION/TRAINING PROGRAM

## 2024-10-23 PROCEDURE — 71000039 HC RECOVERY, EACH ADD'L HOUR: Performed by: STUDENT IN AN ORGANIZED HEALTH CARE EDUCATION/TRAINING PROGRAM

## 2024-10-23 PROCEDURE — 71000016 HC POSTOP RECOV ADDL HR: Performed by: STUDENT IN AN ORGANIZED HEALTH CARE EDUCATION/TRAINING PROGRAM

## 2024-10-23 PROCEDURE — 88307 TISSUE EXAM BY PATHOLOGIST: CPT | Mod: 26,,, | Performed by: PATHOLOGY

## 2024-10-23 PROCEDURE — 71000033 HC RECOVERY, INTIAL HOUR: Performed by: STUDENT IN AN ORGANIZED HEALTH CARE EDUCATION/TRAINING PROGRAM

## 2024-10-23 PROCEDURE — 63600175 PHARM REV CODE 636 W HCPCS: Performed by: NURSE ANESTHETIST, CERTIFIED REGISTERED

## 2024-10-23 PROCEDURE — 58575 LAPS TOT HYST RESJ MAL: CPT | Mod: ,,, | Performed by: STUDENT IN AN ORGANIZED HEALTH CARE EDUCATION/TRAINING PROGRAM

## 2024-10-23 PROCEDURE — 88342 IMHCHEM/IMCYTCHM 1ST ANTB: CPT | Mod: 59 | Performed by: PATHOLOGY

## 2024-10-23 PROCEDURE — 88309 TISSUE EXAM BY PATHOLOGIST: CPT | Performed by: PATHOLOGY

## 2024-10-23 PROCEDURE — 38900 IO MAP OF SENT LYMPH NODE: CPT | Mod: 50,,, | Performed by: STUDENT IN AN ORGANIZED HEALTH CARE EDUCATION/TRAINING PROGRAM

## 2024-10-23 PROCEDURE — 88305 TISSUE EXAM BY PATHOLOGIST: CPT | Mod: 26,,, | Performed by: PATHOLOGY

## 2024-10-23 PROCEDURE — 25000003 PHARM REV CODE 250: Performed by: STUDENT IN AN ORGANIZED HEALTH CARE EDUCATION/TRAINING PROGRAM

## 2024-10-23 PROCEDURE — 38900 IO MAP OF SENT LYMPH NODE: CPT | Mod: AS,50,, | Performed by: PHYSICIAN ASSISTANT

## 2024-10-23 PROCEDURE — 25000003 PHARM REV CODE 250: Performed by: NURSE ANESTHETIST, CERTIFIED REGISTERED

## 2024-10-23 PROCEDURE — 58575 LAPS TOT HYST RESJ MAL: CPT | Mod: AS,,, | Performed by: PHYSICIAN ASSISTANT

## 2024-10-23 PROCEDURE — 38570 LAPAROSCOPY LYMPH NODE BIOP: CPT | Mod: AS,51,, | Performed by: PHYSICIAN ASSISTANT

## 2024-10-23 PROCEDURE — 88112 CYTOPATH CELL ENHANCE TECH: CPT | Mod: 26,,, | Performed by: STUDENT IN AN ORGANIZED HEALTH CARE EDUCATION/TRAINING PROGRAM

## 2024-10-23 PROCEDURE — 36000713 HC OR TIME LEV V EA ADD 15 MIN: Performed by: STUDENT IN AN ORGANIZED HEALTH CARE EDUCATION/TRAINING PROGRAM

## 2024-10-23 PROCEDURE — 88341 IMHCHEM/IMCYTCHM EA ADD ANTB: CPT | Mod: 59 | Performed by: PATHOLOGY

## 2024-10-23 PROCEDURE — 88307 TISSUE EXAM BY PATHOLOGIST: CPT | Performed by: PATHOLOGY

## 2024-10-23 PROCEDURE — 63600175 PHARM REV CODE 636 W HCPCS

## 2024-10-23 PROCEDURE — 82962 GLUCOSE BLOOD TEST: CPT | Performed by: STUDENT IN AN ORGANIZED HEALTH CARE EDUCATION/TRAINING PROGRAM

## 2024-10-23 PROCEDURE — 88309 TISSUE EXAM BY PATHOLOGIST: CPT | Mod: 26,,, | Performed by: PATHOLOGY

## 2024-10-23 PROCEDURE — 71000015 HC POSTOP RECOV 1ST HR: Performed by: STUDENT IN AN ORGANIZED HEALTH CARE EDUCATION/TRAINING PROGRAM

## 2024-10-23 PROCEDURE — 25000003 PHARM REV CODE 250: Performed by: ANESTHESIOLOGY

## 2024-10-23 PROCEDURE — 63600175 PHARM REV CODE 636 W HCPCS: Performed by: STUDENT IN AN ORGANIZED HEALTH CARE EDUCATION/TRAINING PROGRAM

## 2024-10-23 PROCEDURE — P9045 ALBUMIN (HUMAN), 5%, 250 ML: HCPCS | Mod: JZ,JG | Performed by: NURSE ANESTHETIST, CERTIFIED REGISTERED

## 2024-10-23 PROCEDURE — 27201423 OPTIME MED/SURG SUP & DEVICES STERILE SUPPLY: Performed by: STUDENT IN AN ORGANIZED HEALTH CARE EDUCATION/TRAINING PROGRAM

## 2024-10-23 PROCEDURE — 88342 IMHCHEM/IMCYTCHM 1ST ANTB: CPT | Mod: 26,,, | Performed by: PATHOLOGY

## 2024-10-23 PROCEDURE — 37000008 HC ANESTHESIA 1ST 15 MINUTES: Performed by: STUDENT IN AN ORGANIZED HEALTH CARE EDUCATION/TRAINING PROGRAM

## 2024-10-23 PROCEDURE — 88341 IMHCHEM/IMCYTCHM EA ADD ANTB: CPT | Mod: 26,,, | Performed by: PATHOLOGY

## 2024-10-23 PROCEDURE — 38570 LAPAROSCOPY LYMPH NODE BIOP: CPT | Mod: 51,,, | Performed by: STUDENT IN AN ORGANIZED HEALTH CARE EDUCATION/TRAINING PROGRAM

## 2024-10-23 PROCEDURE — 88112 CYTOPATH CELL ENHANCE TECH: CPT | Performed by: STUDENT IN AN ORGANIZED HEALTH CARE EDUCATION/TRAINING PROGRAM

## 2024-10-23 PROCEDURE — 88305 TISSUE EXAM BY PATHOLOGIST: CPT | Mod: 26,,, | Performed by: STUDENT IN AN ORGANIZED HEALTH CARE EDUCATION/TRAINING PROGRAM

## 2024-10-23 PROCEDURE — 37000009 HC ANESTHESIA EA ADD 15 MINS: Performed by: STUDENT IN AN ORGANIZED HEALTH CARE EDUCATION/TRAINING PROGRAM

## 2024-10-23 RX ORDER — DIPHENHYDRAMINE HYDROCHLORIDE 50 MG/ML
25 INJECTION INTRAMUSCULAR; INTRAVENOUS EVERY 6 HOURS PRN
Status: DISCONTINUED | OUTPATIENT
Start: 2024-10-23 | End: 2024-10-23 | Stop reason: HOSPADM

## 2024-10-23 RX ORDER — INDOCYANINE GREEN AND WATER 25 MG
KIT INJECTION
Status: DISCONTINUED | OUTPATIENT
Start: 2024-10-23 | End: 2024-10-23 | Stop reason: HOSPADM

## 2024-10-23 RX ORDER — PROCHLORPERAZINE EDISYLATE 5 MG/ML
5 INJECTION INTRAMUSCULAR; INTRAVENOUS EVERY 30 MIN PRN
Status: DISCONTINUED | OUTPATIENT
Start: 2024-10-23 | End: 2024-10-23 | Stop reason: HOSPADM

## 2024-10-23 RX ORDER — ACETAMINOPHEN 500 MG
1000 TABLET ORAL EVERY 6 HOURS
Qty: 120 TABLET | Refills: 1 | Status: SHIPPED | OUTPATIENT
Start: 2024-10-23

## 2024-10-23 RX ORDER — FENTANYL CITRATE 50 UG/ML
INJECTION, SOLUTION INTRAMUSCULAR; INTRAVENOUS
Status: DISCONTINUED | OUTPATIENT
Start: 2024-10-23 | End: 2024-10-23

## 2024-10-23 RX ORDER — CEFAZOLIN SODIUM 1 G/3ML
2 INJECTION, POWDER, FOR SOLUTION INTRAMUSCULAR; INTRAVENOUS
Status: COMPLETED | OUTPATIENT
Start: 2024-10-23 | End: 2024-10-23

## 2024-10-23 RX ORDER — HYDROMORPHONE HYDROCHLORIDE 2 MG/ML
0.4 INJECTION, SOLUTION INTRAMUSCULAR; INTRAVENOUS; SUBCUTANEOUS EVERY 5 MIN PRN
Status: DISCONTINUED | OUTPATIENT
Start: 2024-10-23 | End: 2024-10-23 | Stop reason: HOSPADM

## 2024-10-23 RX ORDER — OXYCODONE HYDROCHLORIDE 5 MG/1
5 TABLET ORAL
Status: DISCONTINUED | OUTPATIENT
Start: 2024-10-23 | End: 2024-10-23 | Stop reason: HOSPADM

## 2024-10-23 RX ORDER — SODIUM CHLORIDE, SODIUM LACTATE, POTASSIUM CHLORIDE, CALCIUM CHLORIDE 600; 310; 30; 20 MG/100ML; MG/100ML; MG/100ML; MG/100ML
INJECTION, SOLUTION INTRAVENOUS CONTINUOUS
Status: DISCONTINUED | OUTPATIENT
Start: 2024-10-23 | End: 2024-10-23 | Stop reason: HOSPADM

## 2024-10-23 RX ORDER — ROCURONIUM BROMIDE 10 MG/ML
INJECTION, SOLUTION INTRAVENOUS
Status: DISCONTINUED | OUTPATIENT
Start: 2024-10-23 | End: 2024-10-23

## 2024-10-23 RX ORDER — ACETAMINOPHEN 500 MG
1000 TABLET ORAL EVERY 6 HOURS PRN
Status: DISCONTINUED | OUTPATIENT
Start: 2024-10-23 | End: 2024-10-23 | Stop reason: HOSPADM

## 2024-10-23 RX ORDER — ONDANSETRON HYDROCHLORIDE 2 MG/ML
4 INJECTION, SOLUTION INTRAVENOUS EVERY 4 HOURS PRN
Status: DISCONTINUED | OUTPATIENT
Start: 2024-10-23 | End: 2024-10-23 | Stop reason: HOSPADM

## 2024-10-23 RX ORDER — ACETAMINOPHEN 10 MG/ML
INJECTION, SOLUTION INTRAVENOUS
Status: DISCONTINUED | OUTPATIENT
Start: 2024-10-23 | End: 2024-10-23

## 2024-10-23 RX ORDER — IBUPROFEN 600 MG/1
600 TABLET ORAL EVERY 6 HOURS
Qty: 60 TABLET | Refills: 1 | Status: SHIPPED | OUTPATIENT
Start: 2024-10-23

## 2024-10-23 RX ORDER — AMOXICILLIN 250 MG
2 CAPSULE ORAL DAILY PRN
Qty: 30 TABLET | Refills: 0 | Status: SHIPPED | OUTPATIENT
Start: 2024-10-23

## 2024-10-23 RX ORDER — DEXAMETHASONE SODIUM PHOSPHATE 4 MG/ML
INJECTION, SOLUTION INTRA-ARTICULAR; INTRALESIONAL; INTRAMUSCULAR; INTRAVENOUS; SOFT TISSUE
Status: DISCONTINUED | OUTPATIENT
Start: 2024-10-23 | End: 2024-10-23

## 2024-10-23 RX ORDER — LIDOCAINE HYDROCHLORIDE 10 MG/ML
1 INJECTION, SOLUTION EPIDURAL; INFILTRATION; INTRACAUDAL; PERINEURAL ONCE
Status: DISCONTINUED | OUTPATIENT
Start: 2024-10-23 | End: 2024-10-23 | Stop reason: HOSPADM

## 2024-10-23 RX ORDER — HYDROMORPHONE HYDROCHLORIDE 2 MG/ML
0.2 INJECTION, SOLUTION INTRAMUSCULAR; INTRAVENOUS; SUBCUTANEOUS
Status: DISCONTINUED | OUTPATIENT
Start: 2024-10-23 | End: 2024-10-23 | Stop reason: HOSPADM

## 2024-10-23 RX ORDER — PROPOFOL 10 MG/ML
VIAL (ML) INTRAVENOUS
Status: DISCONTINUED | OUTPATIENT
Start: 2024-10-23 | End: 2024-10-23

## 2024-10-23 RX ORDER — PHENYLEPHRINE HYDROCHLORIDE 10 MG/ML
INJECTION INTRAVENOUS
Status: DISCONTINUED | OUTPATIENT
Start: 2024-10-23 | End: 2024-10-23

## 2024-10-23 RX ORDER — ALBUMIN HUMAN 50 G/1000ML
SOLUTION INTRAVENOUS CONTINUOUS PRN
Status: DISCONTINUED | OUTPATIENT
Start: 2024-10-23 | End: 2024-10-23

## 2024-10-23 RX ORDER — GLUCAGON 1 MG
1 KIT INJECTION
Status: DISCONTINUED | OUTPATIENT
Start: 2024-10-23 | End: 2024-10-23 | Stop reason: HOSPADM

## 2024-10-23 RX ORDER — OXYCODONE HYDROCHLORIDE 5 MG/1
5 TABLET ORAL EVERY 4 HOURS PRN
Qty: 15 TABLET | Refills: 0 | Status: SHIPPED | OUTPATIENT
Start: 2024-10-23

## 2024-10-23 RX ORDER — OXYCODONE HYDROCHLORIDE 5 MG/1
5 TABLET ORAL EVERY 4 HOURS PRN
Status: DISCONTINUED | OUTPATIENT
Start: 2024-10-23 | End: 2024-10-23 | Stop reason: HOSPADM

## 2024-10-23 RX ORDER — MEPERIDINE HYDROCHLORIDE 25 MG/ML
12.5 INJECTION INTRAMUSCULAR; INTRAVENOUS; SUBCUTANEOUS ONCE AS NEEDED
Status: DISCONTINUED | OUTPATIENT
Start: 2024-10-23 | End: 2024-10-23 | Stop reason: HOSPADM

## 2024-10-23 RX ORDER — KETOROLAC TROMETHAMINE 30 MG/ML
15 INJECTION, SOLUTION INTRAMUSCULAR; INTRAVENOUS EVERY 6 HOURS PRN
Status: DISCONTINUED | OUTPATIENT
Start: 2024-10-23 | End: 2024-10-23 | Stop reason: HOSPADM

## 2024-10-23 RX ORDER — OXYCODONE HYDROCHLORIDE 5 MG/1
15 TABLET ORAL EVERY 4 HOURS PRN
Qty: 15 TABLET | Refills: 0 | Status: SHIPPED | OUTPATIENT
Start: 2024-10-23 | End: 2024-10-23

## 2024-10-23 RX ORDER — SODIUM CHLORIDE 0.9 % (FLUSH) 0.9 %
3 SYRINGE (ML) INJECTION
Status: DISCONTINUED | OUTPATIENT
Start: 2024-10-23 | End: 2024-10-23 | Stop reason: HOSPADM

## 2024-10-23 RX ORDER — HEPARIN SODIUM 5000 [USP'U]/ML
5000 INJECTION, SOLUTION INTRAVENOUS; SUBCUTANEOUS
Status: COMPLETED | OUTPATIENT
Start: 2024-10-23 | End: 2024-10-23

## 2024-10-23 RX ORDER — LIDOCAINE HYDROCHLORIDE 20 MG/ML
INJECTION INTRAVENOUS
Status: DISCONTINUED | OUTPATIENT
Start: 2024-10-23 | End: 2024-10-23

## 2024-10-23 RX ORDER — LIDOCAINE HYDROCHLORIDE 10 MG/ML
0.5 INJECTION, SOLUTION EPIDURAL; INFILTRATION; INTRACAUDAL; PERINEURAL ONCE
Status: DISCONTINUED | OUTPATIENT
Start: 2024-10-23 | End: 2024-10-23 | Stop reason: HOSPADM

## 2024-10-23 RX ADMIN — CEFAZOLIN 2 G: 330 INJECTION, POWDER, FOR SOLUTION INTRAMUSCULAR; INTRAVENOUS at 08:10

## 2024-10-23 RX ADMIN — OXYCODONE HYDROCHLORIDE 5 MG: 5 TABLET ORAL at 10:10

## 2024-10-23 RX ADMIN — ROCURONIUM BROMIDE 20 MG: 10 INJECTION INTRAVENOUS at 08:10

## 2024-10-23 RX ADMIN — KETOROLAC TROMETHAMINE 15 MG: 30 INJECTION, SOLUTION INTRAMUSCULAR; INTRAVENOUS at 10:10

## 2024-10-23 RX ADMIN — PHENYLEPHRINE HYDROCHLORIDE 100 MCG: 10 INJECTION INTRAVENOUS at 08:10

## 2024-10-23 RX ADMIN — FENTANYL CITRATE 50 MCG: 50 INJECTION, SOLUTION INTRAMUSCULAR; INTRAVENOUS at 08:10

## 2024-10-23 RX ADMIN — ACETAMINOPHEN 1000 MG: 10 INJECTION INTRAVENOUS at 10:10

## 2024-10-23 RX ADMIN — ROCURONIUM BROMIDE 20 MG: 10 INJECTION INTRAVENOUS at 09:10

## 2024-10-23 RX ADMIN — SODIUM CHLORIDE: 0.9 INJECTION, SOLUTION INTRAVENOUS at 07:10

## 2024-10-23 RX ADMIN — LIDOCAINE HYDROCHLORIDE 40 MG: 20 INJECTION, SOLUTION INTRAVENOUS at 08:10

## 2024-10-23 RX ADMIN — PROPOFOL 100 MG: 10 INJECTION, EMULSION INTRAVENOUS at 08:10

## 2024-10-23 RX ADMIN — HEPARIN SODIUM 5000 UNITS: 5000 INJECTION INTRAVENOUS; SUBCUTANEOUS at 07:10

## 2024-10-23 RX ADMIN — ALBUMIN (HUMAN) 100 ML/HR: 12.5 SOLUTION INTRAVENOUS at 08:10

## 2024-10-23 RX ADMIN — DEXAMETHASONE SODIUM PHOSPHATE 8 MG: 4 INJECTION, SOLUTION INTRAMUSCULAR; INTRAVENOUS at 08:10

## 2024-10-23 RX ADMIN — ROCURONIUM BROMIDE 50 MG: 10 INJECTION INTRAVENOUS at 08:10

## 2024-10-23 NOTE — OP NOTE
DATE OF PROCEDURE:  10/23/24     SURGEON: Bob Quiroz MD        ASSISTANTS: Dot Lance who served as first assist  MD Pablo Zambrano MD     PREOPERATIVE DIAGNOSES: Serous Endometrial carcinoma     POSTOPERATIVE DIAGNOSES: Serous Endometrial carcinoma     PROCEDURES:  Robotic-assisted laparoscopic hysterectomy and bilateral   salpingo-oophorectomy   Robotic assisted laparoscopic bilateral sentinel lymph node resection  Injection for sentinel lymph node mapping     COMPLICATIONS: None     ESTIMATED BLOOD LOSS: 25 cc     ANESTHESIA: GETA     INTRAOPERATIVE FINDINGS:  Normal appearing uterus and bilateral ovaries. No peritoneal implants. Filmy adhesion from the descending colon to the anterior abdominal wall. Bilateral mapping to obturator lymph nodes. Normal appearing omentum.        PROCEDURE IN DETAIL: Informed consent was obtained and the patient was taken to   the Operating Suite.  General anesthesia was administered.  Once felt to be   adequate, she was placed in dorsal lithotomy position with her arms tucked.  The   abdomen and pelvis were prepped and draped in the usual fashion.  A Torres catheter was placed to gravity drainage and a speculum was placed in the vagina.  The cervix was visualized, grasped with a single-tooth tenaculum. ICG was injected into the cervix bilaterally at 3 and 9 o'clock.  Serial dilation of cervix was performed and a medium VCare manipulator was placed without difficulty.   Attention was turned to the abdominal portion of the procedure. An 8 mm supra umbilical incision was made and a Veress needle was placed in the peritoneal cavity, confirmed by low opening pressure.  Pneumoperitoneum was obtained with carbon dioxide up to 15 mmHg and a robotic trocar was placed through through the incision.  Intraperitoneal placement was confirmed with the camera.    Three additional robotic trocars were placed in the right and left mid clavicular lines and the left mid  axillary line. A 5mm AirSeal port was placed in the right mid axillary line. A filmy adhesion from the descending colon to the anterior abdominal wall was taken down sharply. All ports were placed under direct visualization. Abdominal survey revealed findings as above.  The patient was placed in steep Trendelenburg and the bowel was retracted out of the pelvis.     The robot was docked and instruments were passed in the operative field.  Pelvic washings were obtained.     Attention was turned to the right side. The retroperitoneum was opened parallel to the infundibulopelvic ligament. There paravesical and pararectal spaces were opened and a sentinel lymph node was identified in the obturator lymph node basin. The ureter and obturator nerve were visualized and kept out of the field of dissection. The sentinel lymph node was grasped and resected and placed in a bag and removed through the robotic port. On the contralateral side, a similar procedure was performed. The sentinel lymph node was identified in the obturator lymph node basin. This was resected ensuring that the ureter and obturator nerve were out of the field of dissection.     The ureters were identified and a windows were created inferior to the ovarian vessels, ensuring that the ureters were out of the field of dissection. The ovarian vessels were cauterized and transected.     The posterior leaves of the broad ligament were dissected down to the level of the cup. The bilateral round ligaments and anterior leaves of the broad ligament were transected and the vesicouterine peritoneum was incised. The bladder was reflected down below the level of the cup and an anterior colopotomy was created. The uterine vessels were further skeletonized, cauterized, and transected, followed by cauterization and transection of the remaining cardinal ligaments. The colpotomy was extended circumferentially and the specimen was removed through the vagina.     The omentum was  brought to the pelvis. The infracolic omentum was resected by making windows and cauterizing pedicles with the bipolar, ensuring that the transverse colon were out of the field of dissection. The specimen was removed through the vagina.    The cuff was then closed with a 2-0 V Lock suture.  Josué was applied to the pelvis. A final survey was done of the abdomen and pelvis.  There was no active bleeding and the integrity of the bowel, bladder, and other visible organs and tissues was again confirmed. Once hemostasis was confirmed, the instruments were removed, the robot was undocked and the pneumoperitoneum was evacuated.  The patient was flattened.  All ports were removed and port sites were inspected and made hemostatic with electrocautery and closed with subcuticular 4-0 Monocryl suture and dermabond. The patient was awoken and taken to Recovery Room in stable condition.  I was present for and performed all key aspects of procedure.      Dot Lance's expertise was needed as there was no qualified   resident available.    Bob Quiroz MD

## 2024-10-23 NOTE — OR NURSING
Pt asleep while sitting on bedpan, when woken for pain assessment, pt rates abdominal pain 10/10. Pt dozing off repeatedly and BP: 93/57, no IV narcotics given @ this time. Pt re-educated on numerical pain scale and explained why no additional pain medicine was being given, verbalized understanding.

## 2024-10-23 NOTE — DISCHARGE SUMMARY
Discharge Summary  Gynecology      Admit Date: 10/23/2024    Discharge Date and Time: 10/23/2024     Attending Physician: Bob Quiroz MD    Principal Diagnoses:   S/P laparoscopic hysterectomy    Active Hospital Problems    Diagnosis  POA    *S/P RA-TLH/BSO/SLND [Z90.710]  No      Resolved Hospital Problems   No resolved problems to display.       Procedures:   Procedure(s) (LRB):  XI ROBOTIC HYSTERECTOMY (N/A)  XI ROBOTIC SALPINGO-OOPHORECTOMY (Bilateral)  MAPPING, LYMPH NODE, SENTINEL (N/A)  BIOPSY, LYMPH NODE (N/A)  XI ROBOTIC OMENTECTOMY    Discharged Condition: good    Hospital Course:   Sharon Garcia is a 79 y.o. y.o.  female who presented on 10/23/2024 for the above-listed procedures for the treatment of serous endometrial cancer. Patient tolerated the procedure well and was admitted for post-operative care. Post-operative course was uncomplicated.    On day of discharge (POD#0), patient was in stable condition, having met all post-operative milestones. She was urinating spontaneously, ambulating, and tolerating a regular diet without nausea/vomiting. Pain was well-controlled on oral medication. She was discharged with medications and follow up as listed below.     Consults: None    Significant Diagnostic Studies:  Recent Labs   Lab 10/16/24  1354   WBC 5.73   HGB 14.3   HCT 43.2   MCV 92           Treatments:   1. Surgery as above    Disposition: Home or Self Care    Patient Instructions:   Current Discharge Medication List        START taking these medications    Details   acetaminophen (TYLENOL) 500 MG tablet Take 2 tablets (1,000 mg total) by mouth every 6 (six) hours. Alternate with ibuprofen every 3 hours as needed for pain.  Qty: 120 tablet, Refills: 1    Associated Diagnoses: S/P laparoscopic hysterectomy      ibuprofen (ADVIL,MOTRIN) 600 MG tablet Take 1 tablet (600 mg total) by mouth every 6 (six) hours. Alternate with acetaminophen every 3 hours as needed for  pain.  Qty: 60 tablet, Refills: 1    Associated Diagnoses: S/P laparoscopic hysterectomy      oxyCODONE (ROXICODONE) 5 MG immediate release tablet Take 1 tablet (5 mg total) by mouth every 4 (four) hours as needed for Pain.  Qty: 15 tablet, Refills: 0    Comments: Quantity prescribed more than 7 day supply? No  Associated Diagnoses: S/P laparoscopic hysterectomy      senna-docusate 8.6-50 mg (SENNA WITH DOCUSATE SODIUM) 8.6-50 mg per tablet Take 2 tablets by mouth daily as needed for Constipation. Continue this medication until you are no longer taking narcotics (oxycodone).  Qty: 30 tablet, Refills: 0    Associated Diagnoses: S/P laparoscopic hysterectomy           CONTINUE these medications which have NOT CHANGED    Details   calcium citrate-vitamin D3 315-200 mg (CITRACAL+D) 315 mg-5 mcg (200 unit) per tablet Take 1 tablet by mouth 2 (two) times daily.      Lactobacillus rhamnosus GG (CULTURELLE) 10 billion cell capsule Take 1 capsule by mouth once daily.      latanoprost 0.005 % ophthalmic solution Place into both eyes.      multivit with minerals/lutein (MULTIVITAMIN 50 PLUS ORAL) Take by mouth.      zinc gluconate 50 mg tablet Take 50 mg by mouth once daily.      denosumab (PROLIA SUBQ) Inject into the skin.             Discharge Procedure Orders   Diet general     Lifting restrictions   Order Comments: No lifting greater than 15 pounds for six weeks.     Other restrictions (specify):   Order Comments: PELVIC REST:  No douching, tampons, or intercourse for 6 weeks.    If prescribed, vaginal estrogen cream may be used during the postoperative period.     DRIVING:  No driving while on narcotics. Driving may be resumed initially with a competent passenger one to two weeks after surgery if no longer taking narcotics.     EXERCISE:  For six weeks your exercise should be limited to walking. You may walk as far as you wish, as long as you increase your level of exertion gradually and avoid slippery surfaces. You may  climb stairs as needed to get around, but should not use stair climbing for exercise.     Remove dressing in 24 hours   Order Comments: If you have a bandage on wound, you may remove it the day after dismissal.  If you had steri-strips remove them once they begin to peel off (usually 2 weeks). Keep incision clean and dry.  Inspect the incision daily for signs and symptoms of infection.     Wound care routine (specify)   Order Comments: WOUND CARE:  If you have a band-aid or bandage on your wound, you may remove it the day after dismissal.  If you had steri-strips remove them once they begin to peel off (usually 2 weeks).  If your steri-strips still haven't come off in 2 weeks, please remove them. You may wash the wound with mild soap and water.   You may shower at any time but should avoid immersing any abdominal incisions in water for at least two weeks after surgery or until the wound is completely healed. If given, please shower with Hibiclens soap until bottle is completely finished. Keep your wound clean and dry.  You should observe your incision for signs of infection which include redness, warmth, drainage or fever.     Call MD for:  temperature >100.4     Call MD for:  persistent nausea and vomiting     Call MD for:  severe uncontrolled pain     Call MD for:  difficulty breathing, headache or visual disturbances     Call MD for:  redness, tenderness, or signs of infection (pain, swelling, redness, odor or green/yellow discharge around incision site)     Call MD for:  hives     Call MD for:   Order Comments: inability to void,urine is ketchup colored or you have large clots, vaginal bleeding is heavier than a period.    VAGINAL DISCHARGE: You may develop a vaginal discharge and intermittent vaginal spotting after surgery and up to 6 weeks postoperatively.  The discharge may have an odor and may change in color but it is normal.  This is due to dissolving stiches.  Contact your surgical team if you develop  vaginal or vulvar irritation along with a discharge.  Also contact your surgical team if you have vaginal discharge that smells like urine or stool.    CONSTIPATION REMEDIES: Patients are often constipated after surgery or with use of oral narcotic medicine. You should continue to take the stool softener, Senokot-S during the next six weeks, and consume adequate amounts of water.  If you have not had a bowel movement for 3 days after dismissal, or are uncomfortable and unable to pass stool, please try one or all of the following measures:  1.  Milk of Magnesia - 30 cc by mouth every 12 hours   2.  Dulcolax suppository - One suppository per rectum every 4-6 hours   3.  Metamucil, Fibercon or other bulk former - use as directed  4.  Fleets Enema        PAIN MEDICATIONS:     Take your pain medications as instructed. It is best to take pain medications before your pain becomes severe. This will allow you to take less medication yet have better pain relief. For the first 2 or 3 days it may be helpful to take your pain medications on a regular schedule (e.g. every 4 to 6 hours). This will help you to keep your pain under better control. You should then begin to take fewer medications each day until you no longer need them. Do not take pain medication on an empty stomach. This may lead to nausea and vomiting.     Activity as tolerated   Order Comments: Return to normal activity slowly as you feel able.  For 6 weeks your exercise should be limited to walking.  You may walk as far as you wish, as long as you increase your level of exertion gradually and avoid slippery surfaces.    If you had a hysterectomy at the surgery do not insert anything in your vagina for 9 weeks.     Shower on day dressing removed (No bath)   Order Comments: You may shower at any time but should avoid immersing any abdominal incisions in water for at least 2 weeks after surgery or until the wound is completely healed.  If given, please shower with  Hibaclens soap until bottle is completely finish        Follow-up Information       Bob Quiroz MD Follow up on 11/7/2024.    Specialty: Gynecologic Oncology  Why: Post-op follow up appointment  Contact information:  [address]  [address] [id number] [phone]                           Aubrie Beasley MD PGY-3  Obstetrics and Gynecology

## 2024-10-23 NOTE — TRANSFER OF CARE
"Anesthesia Transfer of Care Note    Patient: Sharon Garcia    Procedure(s) Performed: Procedure(s) (LRB):  XI ROBOTIC HYSTERECTOMY (N/A)  XI ROBOTIC SALPINGO-OOPHORECTOMY (Bilateral)  MAPPING, LYMPH NODE, SENTINEL (N/A)  BIOPSY, LYMPH NODE (N/A)  XI ROBOTIC OMENTECTOMY    Patient location: PACU    Anesthesia Type: general    Transport from OR: Transported from OR on 6-10 L/min O2 by face mask with adequate spontaneous ventilation    Post pain: adequate analgesia    Post assessment: no apparent anesthetic complications and tolerated procedure well    Post vital signs: stable    Level of consciousness: awake    Nausea/Vomiting: no nausea/vomiting    Complications: none    Transfer of care protocol was followed      Last vitals: Visit Vitals  /64 (BP Location: Left arm, Patient Position: Lying)   Pulse 70   Temp 36.3 °C (97.4 °F) (Temporal)   Resp 16   Ht 4' 10" (1.473 m)   Wt 47.6 kg (105 lb)   SpO2 97%   Breastfeeding No   BMI 21.95 kg/m²     "

## 2024-10-23 NOTE — OPERATIVE NOTE ADDENDUM
Certification of Assistant at Surgery       Surgery Date: 10/23/2024     Participating Surgeons:  Surgeons and Role:     * Bob Quiroz MD - Primary     * Aubrie Beasley MD - Resident - Assisting     * Pablo Brooks MD - Resident - Assisting    Procedures:  Procedure(s) (LRB):  XI ROBOTIC HYSTERECTOMY (N/A)  XI ROBOTIC SALPINGO-OOPHORECTOMY (Bilateral)  MAPPING, LYMPH NODE, SENTINEL (N/A)  BIOPSY, LYMPH NODE (N/A)  XI ROBOTIC OMENTECTOMY    Assistant Surgeon's Certification of Necessity:  I understand that section 1842 (b) (6) (d) of the Social Security Act generally prohibits Medicare Part B reasonable charge payment for the services of assistants at surgery in teaching hospitals when qualified residents are available to furnish such services. I certify that the services for which payment is claimed were medically necessary, and that no qualified resident was available to perform the services. I further understand that these services are subject to post-payment review by the Medicare carrier.      MUNDO Purvis    10/23/2024  12:34 PM

## 2024-10-23 NOTE — ANESTHESIA POSTPROCEDURE EVALUATION
Anesthesia Post Evaluation    Patient: Sharon Garcia    Procedure(s) Performed: Procedure(s) (LRB):  XI ROBOTIC HYSTERECTOMY (N/A)  XI ROBOTIC SALPINGO-OOPHORECTOMY (Bilateral)  MAPPING, LYMPH NODE, SENTINEL (N/A)  BIOPSY, LYMPH NODE (N/A)  XI ROBOTIC OMENTECTOMY    Final Anesthesia Type: general      Patient location during evaluation: PACU  Patient participation: Yes- Able to Participate  Level of consciousness: awake and alert  Post-procedure vital signs: reviewed and stable  Pain management: adequate  Airway patency: patent    PONV status at discharge: No PONV  Anesthetic complications: no      Cardiovascular status: blood pressure returned to baseline  Respiratory status: unassisted  Hydration status: euvolemic  Follow-up not needed.              Vitals Value Taken Time   /53 10/23/24 1117   Temp 36.3 °C (97.4 °F) 10/23/24 1032   Pulse 64 10/23/24 1125   Resp 16 10/23/24 1115   SpO2 97 % 10/23/24 1125   Vitals shown include unfiled device data.      No case tracking events are documented in the log.      Pain/Red Score: Pain Rating Prior to Med Admin: 4 (10/23/2024 10:50 AM)  Red Score: 9 (10/23/2024 10:47 AM)

## 2024-10-23 NOTE — OR NURSING
Pt sleeping with eyes closed and snoring, awakens to verbal stimuli, rates abdominal pain 8/10, pt repeatedly falling asleep during conversation, no additional pain medication given. FLACC pain scale: 3  Explained to pt that she was unable to tolerate additional medication and we would transfer her to the next phase of recovery, verbalized understanding

## 2024-10-23 NOTE — ANESTHESIA PROCEDURE NOTES
Intubation    Date/Time: 10/23/2024 8:06 AM    Performed by: Faith Barnes  Authorized by: João Kenny MD    Intubation:     Induction:  Intravenous    Intubated:  Postinduction    Mask Ventilation:  Easy with oral airway    Attempts:  1    Attempted By:  Student (Angelica Corado)    Method of Intubation:  Video laryngoscopy    Blade:  Camacho 3    Laryngeal View Grade: Grade I - full view of cords      Difficult Airway Encountered?: No      Complications:  None    Airway Device:  Oral endotracheal tube    Airway Device Size:  7.0    Style/Cuff Inflation:  Cuffed (inflated to minimal occlusive pressure)    Placement Verified By:  Capnometry    Complicating Factors:  None    Findings Post-Intubation:  BS equal bilateral and atraumatic/condition of teeth unchanged       Ambulatory

## 2024-10-23 NOTE — PLAN OF CARE
Sharon Garcia has met all discharge criteria from Phase II. Vital Signs are stable, ambulating  without difficulty. Discharge instructions given, patient verbalized understanding. Discharged from facility via wheelchair in stable condition.

## 2024-10-24 ENCOUNTER — NURSE TRIAGE (OUTPATIENT)
Dept: ADMINISTRATIVE | Facility: CLINIC | Age: 79
End: 2024-10-24
Payer: MEDICARE

## 2024-10-24 LAB
FINAL PATHOLOGIC DIAGNOSIS: NORMAL
Lab: NORMAL

## 2024-10-24 NOTE — TELEPHONE ENCOUNTER
Pt is calling with bilateral calf pain/ soreness and no swelling or redness she was just discharged yesterday and had robotic Lap Hyst. Pt had compression stocking and I told her that it could have been due to legs in stirrups. Pt also c/o numbness to one of her foot to right side but with walking it has gotten a lot better this afternoon. I triaged for leg pain and alerted since post major surgery. Care advice was the ED or PCP triage and MD is busy ant nor available. Pt also had some orange color stuff around her knee area and it came off with etoh and told her that it was probably prep and that's all good. I will send message to provider and [facility] for advice             Reason for Disposition   Major surgery in past month    Additional Information   Negative: Looks like a broken bone or dislocated joint (e.g., crooked or deformed)   Negative: Sounds like a life-threatening emergency to the triager   Negative: Chest pain   Negative: Difficulty breathing   Negative: Entire foot is cool or blue in comparison to other side   Negative: Unable to walk   Negative: Fever and red area (or area very tender to touch)   Negative: Swollen joint and fever   Negative: Thigh or calf pain in only one leg and present > 1 hour   Negative: Thigh, calf, or ankle swelling in only one leg   Negative: Thigh, calf, or ankle swelling in both legs, but one side is definitely more swollen   Negative: History of prior 'blood clot' in leg or lungs (i.e., deep vein thrombosis, pulmonary embolism)   Negative: History of inherited increased risk of blood clots (e.g., factor 5 Leiden, antithrombin 3, protein C or protein S deficiency, prothrombin mutation)    Protocols used: Leg Pain-A-OH

## 2024-10-24 NOTE — PROVIDER PROGRESS NOTES - EMERGENCY DEPT.
Encounter Date: 10/24/2024  [facility] Plan of Care Note    Referral source: Nurse On-Call      Reason for consult:  Bilateral lower extremity pain without history of trauma, ambulatory      Disposition recommended: Care at home      Additional Recommendations:  I have reviewed this patient's medical record.  79-year-old woman recently discharged from [facility] after undergoing an elective hysterectomy.  No documented perioperative complications noted.  Patient describes bilateral lower extremity tenderness without swelling, redness, chest pain, or shortness of breath.  I reported that soft tissue pain is likely related to compression hose/SCDs utilized during perioperative period.  I have recommended to monitor symptoms and take Tylenol 650 mg every 4-6 hours as needed for pain.  Contact gynecology next available.

## 2024-10-24 NOTE — TELEPHONE ENCOUNTER
MD with [facility] responded and said that pt was ok unless evidence of shortness of breath, significant unilateral leg swelling, vaginal bleeding, or pleuritic chest pain, and her pain is improving with ambulation. Pt to call back as needed if any worsening or questions or concerns

## 2024-10-25 ENCOUNTER — TELEPHONE (OUTPATIENT)
Dept: GYNECOLOGIC ONCOLOGY | Facility: CLINIC | Age: 79
End: 2024-10-25
Payer: MEDICARE

## 2024-10-25 NOTE — TELEPHONE ENCOUNTER
"Called to f/u with pt today after reaching out to triage RN for c/o leg pain. Pt reports feeling "much better" today. Leg pain resolved with tylenol/ibuprofen. No longer experiencing numbness. Pt has been getting up and ambulating with no issues. No other concerns at this time. Pt encouraged to reach out if she has any additional concerns.   "

## 2024-10-31 ENCOUNTER — TELEPHONE (OUTPATIENT)
Dept: GYNECOLOGIC ONCOLOGY | Facility: CLINIC | Age: 79
End: 2024-10-31
Payer: MEDICARE

## 2024-11-01 LAB
COMMENT: ABNORMAL
FINAL PATHOLOGIC DIAGNOSIS: ABNORMAL
GROSS: ABNORMAL
Lab: ABNORMAL
SUPPLEMENTAL DIAGNOSIS: ABNORMAL

## 2024-11-04 DIAGNOSIS — C54.1 ENDOMETRIAL CANCER: Primary | ICD-10-CM

## 2024-11-07 ENCOUNTER — OFFICE VISIT (OUTPATIENT)
Dept: GYNECOLOGIC ONCOLOGY | Facility: CLINIC | Age: 79
End: 2024-11-07
Payer: MEDICARE

## 2024-11-07 VITALS
TEMPERATURE: 98 F | WEIGHT: 107.13 LBS | HEART RATE: 84 BPM | OXYGEN SATURATION: 97 % | BODY MASS INDEX: 22.49 KG/M2 | HEIGHT: 58 IN

## 2024-11-07 DIAGNOSIS — C54.1 ENDOMETRIAL CANCER: Primary | ICD-10-CM

## 2024-11-07 PROCEDURE — 99999 PR PBB SHADOW E&M-EST. PATIENT-LVL IV: CPT | Mod: PBBFAC,,, | Performed by: STUDENT IN AN ORGANIZED HEALTH CARE EDUCATION/TRAINING PROGRAM

## 2024-11-07 NOTE — Clinical Note
I entered a plan for carboplatin taxol. Could you please schedule labs, infusion, and MD visit when insurance approval is obtained?  Ok to start chemo in 2 weeks.

## 2024-11-07 NOTE — Clinical Note
Steven thanks for sending Sharon to see me. She us a wonderful lady and speaks very highly of you. Her final path showed a IB serous endometrial cancer and I'm recommending adjuvant chemotherapy. She's also interested in a second opinion at [facility] ([place]) to see if there are any clinical trials available (our uterine serous cancer trial is unfortunately no longer enrolling early stage patients).   Thanks again, Bob

## 2024-11-07 NOTE — Clinical Note
Varinder Victoria, this patient is interested in a second opinion at [facility] to see if there are any clinical trials she is a candidate for. Could you help her navigate this process?  Thanks, Bob

## 2024-11-07 NOTE — PROGRESS NOTES
Referring Provider:  Wiedemann, Michael A., MD  [address] [place] [address]  [place] [address]   Subjective:      Patient ID: Sharon Garcia is a 79 y.o. female.    Chief Complaint: No chief complaint on file.    Problem List Items Addressed This Visit       Endometrial cancer - Primary    Overview     Diagnosis:  IB uterine serous carcinoma    Surgery:  10/23/24: RA-TLH BSO SLN Omentectomy. 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 77% MI. Extensive LVSI. 0/2 SLN. Omentum negative. P53 abnormal, pMMR, Her2 3+    Adjuvant therapy:  Carboplatin AUC 5 paclitaxel 175 mg/m2             HPI Recovering well from surgery. Denies vaginal bleeding. Reports some bruising following surgery that has improved.     Review of Systems   Constitutional:  Negative for chills, fatigue and fever.   Respiratory:  Negative for cough and shortness of breath.    Cardiovascular:  Negative for chest pain.   Gastrointestinal:  Negative for abdominal distention, abdominal pain, constipation and diarrhea.   Genitourinary:  Negative for dysuria, pelvic pain and vaginal bleeding.   Musculoskeletal:  Negative for back pain.   Psychiatric/Behavioral:  Negative for dysphoric mood. The patient is not nervous/anxious.      Past Medical History:   Diagnosis Date    Breast cancer     '91 Left, '93 right; alicia mastectomy, no chemo or radiation; no genetic testing    Lower back pain     Osteoporosis     Unspecified glaucoma       Past Surgical History:   Procedure Laterality Date    LYMPH NODE BIOPSY N/A 10/23/2024    Procedure: BIOPSY, LYMPH NODE;  Surgeon: Bob Quiroz MD;  Location: [facility];  Service: Gynecology Oncology;  Laterality: N/A;    MAPPING, LYMPH NODE, SENTINEL N/A 10/23/2024    Procedure: MAPPING, LYMPH NODE, SENTINEL;  Surgeon: Bob Quiroz MD;  Location: [facility];  Service: Gynecology Oncology;  Laterality: N/A;  correct code# 05237    MASTECTOMY Bilateral     ROBOT-ASSISTED LAPAROSCOPIC ABDOMINAL  HYSTERECTOMY USING DA TANYA XI N/A 10/23/2024    Procedure: XI ROBOTIC HYSTERECTOMY;  Surgeon: Bob Quiroz MD;  Location: [facility] OR;  Service: Gynecology Oncology;  Laterality: N/A;  2 hr case/ correct code# 32431    ROBOT-ASSISTED LAPAROSCOPIC OMENTECTOMY USING DA TANYA XI  10/23/2024    Procedure: XI ROBOTIC OMENTECTOMY;  Surgeon: Bob Quiroz MD;  Location: [facility] OR;  Service: Gynecology Oncology;;    ROBOT-ASSISTED LAPAROSCOPIC SALPINGO-OOPHORECTOMY USING DA TANYA XI Bilateral 10/23/2024    Procedure: XI ROBOTIC SALPINGO-OOPHORECTOMY;  Surgeon: Bob Quiroz MD;  Location: [facility] OR;  Service: Gynecology Oncology;  Laterality: Bilateral;  correct code# 06508      Family History   Problem Relation Name Age of Onset    Hypertension Mother      Other (sarcoma) Sister      Ovarian cancer Neg Hx      Uterine cancer Neg Hx      Breast cancer Neg Hx      Colon cancer Neg Hx        Social History     Socioeconomic History    Marital status:         Objective:      Vitals:    11/07/24 1025   Pulse: 84   Temp: 97.7 °F (36.5 °C)        Physical Exam  Constitutional:       General: She is not in acute distress.  HENT:      Head: Normocephalic.   Eyes:      Extraocular Movements: Extraocular movements intact.      Conjunctiva/sclera: Conjunctivae normal.   Cardiovascular:      Rate and Rhythm: Normal rate.      Pulses: Normal pulses.   Pulmonary:      Effort: Pulmonary effort is normal. No respiratory distress.      Breath sounds: No wheezing.   Abdominal:      General: There is no distension.      Tenderness: There is no abdominal tenderness. There is no guarding or rebound.      Comments: Robotic incisions well healed.   Genitourinary:     Comments: External genitalia normal. Vagina normal. Uterus, cervix, and adnexa surgically absent. Vaginal cuff intact. A female staff member was present for the exam.      Musculoskeletal:         General: No deformity.   Neurological:      Mental Status: She  is alert and oriented to person, place, and time.   Psychiatric:         Mood and Affect: Mood normal.         Behavior: Behavior normal.         Thought Content: Thought content normal.         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 Preoperative imaging without evidence of metastatic disease. Reviewed pathology findings in detail. Discussed options for adjuvant therapy including chemotherapy and/or radiation. We reviewed the rationale for adjuvant therapy given the risk of distant recurrences with serous endometrial cancer. Reviewed that if cancer does recur, it may be treatable but not curable. I recommend adjuvant systemic therapy with Carboplatin AUC 5 and paclitaxel 175 mg/m2. We reviewed the potential side effects of chemotherapy including hematologic effects, alopecia, neuropathy, hypersensitivity reactions, arthralgias, nausea, and fatigue. Following this discussion, she expresses agreement with the plan.   Schedule labs, infusion and MD visit prior to C1.  Chemo class  Palliative care referral  Esme to help with navigation of second opinion at [facility] to see if there are any clinical trials for which she is a candidate.      Visit today is associated with current or anticipated ongoing medical care related to this patient's single serious condition/complex condition: endometrial cancer.     Though this visit took place within the global post op period, counseling today covered issues not related to the surgery, specifically the prognosis of her pathology and the pros and cons of adjuvant treatment including chemotherapy, radiation, or additional surgery.          Bob Quiroz MD

## 2024-11-11 ENCOUNTER — TELEPHONE (OUTPATIENT)
Dept: GYNECOLOGIC ONCOLOGY | Facility: CLINIC | Age: 79
End: 2024-11-11
Payer: MEDICARE

## 2024-11-11 ENCOUNTER — TELEPHONE (OUTPATIENT)
Dept: PALLIATIVE MEDICINE | Facility: OTHER | Age: 79
End: 2024-11-11
Payer: MEDICARE

## 2024-11-11 NOTE — TELEPHONE ENCOUNTER
----- Message from Yaya sent at 11/11/2024  2:30 PM CST -----  Regarding: Second Opinion  Name of Who is Calling:  Patient          What is the request in detail:  Please contact patient she would like to speak with Dr. Quiroz about a second opinion            Can the clinic reply by [facility]: No            What [facility] to [facility] Back [facility]: [phone]

## 2024-11-11 NOTE — TELEPHONE ENCOUNTER
Called to schedule palliative care appt at [facility]. Patient requested [facility] since it is more convenient. Will let the palliative team at [facility] know.

## 2024-11-11 NOTE — TELEPHONE ENCOUNTER
"Two pt identifiers used. Pt inquiring about receiving care at [facility]. Per Dr. Quiroz's 11/7 office visit, "Esme to help with navigation of second opinion at [facility] to see if there are any clinical trials for which she is a candidate." Will route to nurse navigator for assistance. Pt verbalizes understanding. No additional concerns.  "

## 2024-11-12 ENCOUNTER — TELEPHONE (OUTPATIENT)
Dept: GYNECOLOGIC ONCOLOGY | Facility: CLINIC | Age: 79
End: 2024-11-12
Payer: MEDICARE

## 2024-11-12 NOTE — TELEPHONE ENCOUNTER
"Spoke to Fiorella on [facility] referral line ([phone]). Provided her with all requested information and Informed her pt being referred by Dr. Quiroz for  Clinical Trials options per MD note on 11/7/24. Per Fiorella no records need to be faxed due to Epic integration with [facility]. Fiorella informed me pt info will be routed to "clinical trials center for review and updates will be sent via fax, [phone]"  "

## 2024-11-13 DIAGNOSIS — C54.1 MALIGNANT NEOPLASM OF ENDOMETRIUM: ICD-10-CM

## 2024-11-13 DIAGNOSIS — Z51.12 ENCOUNTER FOR ANTINEOPLASTIC CHEMOTHERAPY AND IMMUNOTHERAPY: Primary | ICD-10-CM

## 2024-11-13 DIAGNOSIS — Z51.11 ENCOUNTER FOR ANTINEOPLASTIC CHEMOTHERAPY AND IMMUNOTHERAPY: Primary | ICD-10-CM

## 2024-11-14 NOTE — TELEPHONE ENCOUNTER
Call placed to inform patient that chemotherapy is approved for scheduling. Desired location of treatment/labs discussed- patient prefers [place] location for chemo and labs. Patient scheduled on 12/13/2024 per request due to [facility] visit scheduled 12/5-12/11/2024. Reviewed frequency of regimen and preferred pharmacy for pre-cycle medications. Detailed instructions provided for all pre-cycle medications. ED precautions reviewed and all questions answered. Patient verbalized understanding.      Chemotherapy education materials, as well as contact info, to be provided in chemo class scheduled on 11/25/2024 with JNOATHON Ascencio.     .

## 2024-11-15 RX ORDER — DEXAMETHASONE 4 MG/1
8 TABLET ORAL DAILY
Qty: 6 TABLET | Refills: 5 | Status: SHIPPED | OUTPATIENT
Start: 2024-11-15

## 2024-11-15 RX ORDER — OLANZAPINE 5 MG/1
5 TABLET ORAL NIGHTLY
Qty: 4 TABLET | Refills: 5 | Status: SHIPPED | OUTPATIENT
Start: 2024-11-15 | End: 2025-11-15

## 2024-11-15 RX ORDER — PROCHLORPERAZINE MALEATE 5 MG
5 TABLET ORAL EVERY 6 HOURS PRN
Qty: 20 TABLET | Refills: 5 | Status: SHIPPED | OUTPATIENT
Start: 2024-11-15 | End: 2025-11-15

## 2024-11-18 ENCOUNTER — TELEPHONE (OUTPATIENT)
Dept: GYNECOLOGIC ONCOLOGY | Facility: CLINIC | Age: 79
End: 2024-11-18
Payer: MEDICARE

## 2024-11-18 NOTE — TELEPHONE ENCOUNTER
----- Message from Silva Alcantara NP sent at 11/15/2024  4:25 PM CST -----    ----- Message -----  From: Penny Rviera  Sent: 11/15/2024   4:19 PM CST  To: Quinton Carroll Staff     Name of Who is Calling:     What is the request in detail:  patient request call back in reference to scheduled appointment / medications questions and concerns Please contact to further discuss and advise      Can the clinic reply by [facility]:     What Number to Call Back if not in [facility]:   [phone]

## 2024-11-22 NOTE — PROGRESS NOTES
Subjective     Patient ID: Sharon Garcia is a 79 y.o. female.    Chief Complaint: Chemotherapy (Chemo class)    HPI    Problem List Items Addressed This Visit         Endometrial cancer - Primary     Overview       Diagnosis:  IB uterine serous carcinoma     Surgery:  10/23/24: RA-TLH BSO SLN Omentectomy. 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 77% MI. Extensive LVSI. 0/2 SLN. Omentum negative. P53 abnormal, pMMR, Her2 3+     Adjuvant therapy:  Carboplatin AUC 5 paclitaxel 175 mg/m2           Here today for education prior to starting treatment for endometrial cancer. Her oncologist, Dr. Quiroz, recommended treatment with Carboplatin/ Paclitaxel and the patient has agreed to proceed.      Review of Systems   Constitutional:  Negative for appetite change, chills, diaphoresis, fatigue, fever and unexpected weight change.   Respiratory:  Negative for chest tightness, shortness of breath and wheezing.    Cardiovascular:  Negative for chest pain, palpitations and leg swelling.   Gastrointestinal:  Negative for abdominal pain, constipation, diarrhea, nausea and vomiting.   Genitourinary:  Negative for difficulty urinating, dysuria, flank pain, frequency, hematuria, pelvic pain, urgency, vaginal bleeding, vaginal discharge and vaginal pain.   Musculoskeletal:  Negative for back pain.   Integumentary:  Negative for color change.   Neurological:  Negative for dizziness, light-headedness, numbness and headaches.   Psychiatric/Behavioral:  Negative for agitation. The patient is not nervous/anxious.      There were no vitals taken for this visit.       Objective     Physical Exam  Vitals and nursing note reviewed.   Constitutional:       General: She is not in acute distress.     Appearance: Normal appearance. She is well-developed. She is not diaphoretic.   HENT:      Head: Normocephalic and atraumatic.   Eyes:      General: No scleral icterus.  Pulmonary:      Effort: Pulmonary effort is normal. No  respiratory distress.   Abdominal:      General: There is no distension.      Palpations: Abdomen is soft.   Musculoskeletal:         General: Normal range of motion.      Cervical back: Normal range of motion.      Right lower leg: No edema.      Left lower leg: No edema.   Skin:     General: Skin is warm and dry.      Coloration: Skin is not pale.      Findings: No rash.   Neurological:      Mental Status: She is alert and oriented to person, place, and time.   Psychiatric:         Mood and Affect: Mood normal.         Behavior: Behavior normal.            Assessment and Plan     1. Malignant neoplasm of endometrium    2. Encounter for antineoplastic chemotherapy and immunotherapy      Plan for adjuvant chemotherapy.      Treatment plan of Carboplatin/ Paclitaxel every 21 days x 6 cycles discussed with patient. Reviewed duration of treatment and schedule with patient.   Handouts provided on chemotherapy agents. Premeds, supportive meds explained.  Discussed the mechanism of action, potential side effects of this treatment as well as ways to manage them at home.   Dietary modifications discussed. Detailed instructions to be provided in handouts.  Chemotherapy folder supplied with contact information and additional educational material.   Discussed follow-up with the physician/TERRY for toxicity monitoring throughout treatment.    Cycle 1, Day 1 scheduled for 12/13/2024; home prescriptions sent to pharmacy.  Informed consent already obtained by Dr. Quiroz and scanned into media.  Encouraged to call office - phone number provided - to assist with any concerns.     45 minutes were spent in coordination of patient's care, record review and counseling.          No follow-ups on file.

## 2024-11-25 ENCOUNTER — OFFICE VISIT (OUTPATIENT)
Dept: GYNECOLOGIC ONCOLOGY | Facility: CLINIC | Age: 79
End: 2024-11-25
Payer: MEDICARE

## 2024-11-25 DIAGNOSIS — C54.1 MALIGNANT NEOPLASM OF ENDOMETRIUM: Primary | ICD-10-CM

## 2024-11-25 DIAGNOSIS — Z51.12 ENCOUNTER FOR ANTINEOPLASTIC CHEMOTHERAPY AND IMMUNOTHERAPY: ICD-10-CM

## 2024-11-25 DIAGNOSIS — Z51.11 ENCOUNTER FOR ANTINEOPLASTIC CHEMOTHERAPY AND IMMUNOTHERAPY: ICD-10-CM

## 2024-11-25 PROCEDURE — 1160F RVW MEDS BY RX/DR IN RCRD: CPT | Mod: CPTII,S$GLB,, | Performed by: NURSE PRACTITIONER

## 2024-11-25 PROCEDURE — 1159F MED LIST DOCD IN RCRD: CPT | Mod: CPTII,S$GLB,, | Performed by: NURSE PRACTITIONER

## 2024-11-25 PROCEDURE — 99999 PR PBB SHADOW E&M-EST. PATIENT-LVL III: CPT | Mod: PBBFAC,,, | Performed by: NURSE PRACTITIONER

## 2024-11-25 PROCEDURE — 99215 OFFICE O/P EST HI 40 MIN: CPT | Mod: 24,S$GLB,, | Performed by: NURSE PRACTITIONER

## 2024-12-06 ENCOUNTER — TELEPHONE (OUTPATIENT)
Dept: GYNECOLOGIC ONCOLOGY | Facility: CLINIC | Age: 79
End: 2024-12-06
Payer: MEDICARE

## 2024-12-06 NOTE — TELEPHONE ENCOUNTER
----- Message from Caitlin sent at 12/6/2024 12:37 PM CST -----  Regarding: appt  Type:  Patient Returning Call      Name of who is calling:pt        What is request in detail:pt is requesting a call back in regards to being seen by the doctor before her chemo appt on 12/13. Patient was to be at [facility] for the week, but has been released early and would like to go over [facility] recommendations with doctor.        Can clinic reply by [facility]:yes        What number to call back if not in [facility]:[phone]

## 2024-12-09 ENCOUNTER — PATIENT MESSAGE (OUTPATIENT)
Dept: HEMATOLOGY/ONCOLOGY | Facility: CLINIC | Age: 79
End: 2024-12-09
Payer: MEDICARE

## 2024-12-09 ENCOUNTER — TELEPHONE (OUTPATIENT)
Dept: GYNECOLOGIC ONCOLOGY | Facility: CLINIC | Age: 79
End: 2024-12-09
Payer: MEDICARE

## 2024-12-09 NOTE — TELEPHONE ENCOUNTER
----- Message from Mary Lou sent at 12/9/2024 10:44 AM CST -----  Regarding: Patient Returning Missed Call  Type:  Patient Returning Call    Who Called:AYLEEN LEROY    Who Left Message for Patient:Argelia Michel RN    Does the patient know what this is regarding?:Unknown    Would the patient rather a call back or a response via [facility]? Call back    Best Call Back Number: [phone]    Additional Information:

## 2024-12-11 ENCOUNTER — TELEPHONE (OUTPATIENT)
Dept: GYNECOLOGIC ONCOLOGY | Facility: CLINIC | Age: 79
End: 2024-12-11
Payer: MEDICARE

## 2024-12-11 DIAGNOSIS — Z51.12 ENCOUNTER FOR ANTINEOPLASTIC CHEMOTHERAPY AND IMMUNOTHERAPY: ICD-10-CM

## 2024-12-11 DIAGNOSIS — Z51.11 ENCOUNTER FOR ANTINEOPLASTIC CHEMOTHERAPY AND IMMUNOTHERAPY: ICD-10-CM

## 2024-12-11 DIAGNOSIS — C54.1 MALIGNANT NEOPLASM OF ENDOMETRIUM: Primary | ICD-10-CM

## 2024-12-11 NOTE — TELEPHONE ENCOUNTER
Called pt to confirm Dr Quiroz knows about recommendations for adjuvant radiation and will discuss with her when they visit before cycle 2. Several questions answered and nutrition referral placed per pt request.  Silva Alcantara, FNP-C, [facility]  Gynecologic Oncology    ----- Message from Bob Quiroz MD sent at 12/11/2024  9:51 AM CST -----  Regarding: RE: Patient advice  Contact: Pt  [phone]  I saw Amir's note. He recommended chemotherapy and vaginal brachytherapy. Ok to proceed with chemo and we will discuss adjuvant vaginal brachytherapy at her return visit. Silva, how about I see her for C2 and you can see her for C3?  ----- Message -----  From: Silva Alcantara NP  Sent: 12/11/2024   8:31 AM CST  To: Argelia Michel RN; Bob Quiroz MD  Subject: RE: Patient advice                               Her first cycle is Friday. I think she should discuss with him first. He is in OR in [place] today but I'm going to copy him on this.  Silva  ----- Message -----  From: Argelia Michel RN  Sent: 12/11/2024   8:17 AM CST  To: Silva Alcantara NP  Subject: FW: Patient advice                               Good Morning  Patient had an appt at [facility] and says they recommended radiation. Would like to know if she needs to discuss recs with [facility] first before receiving next cycle.   Argelia  ----- Message -----  From: Argelia Michel RN  Sent: 12/9/2024   4:29 PM CST  To: Argelia Michel RN  Subject: FW: Patient advice                                 ----- Message -----  From: Batsheva Morel RN  Sent: 12/9/2024   4:03 PM CST  To: Argelia Michel RN  Subject: FW: Patient advice                                 ----- Message -----  From: Yvonne Cedillo  Sent: 12/9/2024   3:54 PM CST  To: Bob Quiroz Staff  Subject: Patient advice                                           Name of Caller: Sharon      Contact Preference:  [phone]    Nature of Call:  Requesting a call  back from Argelia

## 2024-12-12 ENCOUNTER — TELEPHONE (OUTPATIENT)
Dept: HEMATOLOGY/ONCOLOGY | Facility: CLINIC | Age: 79
End: 2024-12-12
Payer: MEDICARE

## 2024-12-12 ENCOUNTER — TELEPHONE (OUTPATIENT)
Dept: GYNECOLOGIC ONCOLOGY | Facility: CLINIC | Age: 79
End: 2024-12-12
Payer: MEDICARE

## 2024-12-12 NOTE — TELEPHONE ENCOUNTER
"----- Message from Sandhya sent at 12/12/2024  1:24 PM CST -----  Regarding: Medical Advice  "Type:  Patient Call Back    Who Called:PT     What is the reqeust in detail:Pt requesting call back has an question in regards to an medication she is taking. Please advise    Can the clinic reply by [facility]?no     Best Call Back Number:[phone]      Additional Information:Pt has Chemo tomorrow and would like to know if she should take her Xyzal medication  "

## 2024-12-12 NOTE — TELEPHONE ENCOUNTER
Spoke w/ pt in regards to scheduling nutrition referral placed by Silva Alcantara Pt approved scheduling with Esteban Choudhary RD for in-person on 01/09/2025 @ 1pm MA informed pt that clinic is located on the 3rd floor of the [facility]. Pt acknowledged.      REJI PINO

## 2024-12-13 ENCOUNTER — INFUSION (OUTPATIENT)
Dept: INFUSION THERAPY | Facility: HOSPITAL | Age: 79
End: 2024-12-13
Payer: MEDICARE

## 2024-12-13 ENCOUNTER — LAB VISIT (OUTPATIENT)
Dept: LAB | Facility: HOSPITAL | Age: 79
End: 2024-12-13
Payer: MEDICARE

## 2024-12-13 VITALS
WEIGHT: 105.19 LBS | HEIGHT: 58 IN | BODY MASS INDEX: 22.08 KG/M2 | OXYGEN SATURATION: 95 % | RESPIRATION RATE: 18 BRPM | HEART RATE: 76 BPM | DIASTOLIC BLOOD PRESSURE: 63 MMHG | TEMPERATURE: 98 F | SYSTOLIC BLOOD PRESSURE: 132 MMHG

## 2024-12-13 DIAGNOSIS — Z51.11 ENCOUNTER FOR ANTINEOPLASTIC CHEMOTHERAPY AND IMMUNOTHERAPY: ICD-10-CM

## 2024-12-13 DIAGNOSIS — C54.1 ENDOMETRIAL CANCER: Primary | ICD-10-CM

## 2024-12-13 DIAGNOSIS — C54.1 ENDOMETRIAL CANCER: ICD-10-CM

## 2024-12-13 DIAGNOSIS — Z51.12 ENCOUNTER FOR ANTINEOPLASTIC CHEMOTHERAPY AND IMMUNOTHERAPY: ICD-10-CM

## 2024-12-13 LAB
ALBUMIN SERPL BCP-MCNC: 3.8 G/DL (ref 3.5–5.2)
ALP SERPL-CCNC: 81 U/L (ref 40–150)
ALT SERPL W/O P-5'-P-CCNC: 18 U/L (ref 10–44)
ANION GAP SERPL CALC-SCNC: 8 MMOL/L (ref 8–16)
AST SERPL-CCNC: 20 U/L (ref 10–40)
BILIRUB SERPL-MCNC: 0.9 MG/DL (ref 0.1–1)
BUN SERPL-MCNC: 14 MG/DL (ref 8–23)
CALCIUM SERPL-MCNC: 9.4 MG/DL (ref 8.7–10.5)
CHLORIDE SERPL-SCNC: 104 MMOL/L (ref 95–110)
CO2 SERPL-SCNC: 30 MMOL/L (ref 23–29)
CREAT SERPL-MCNC: 0.7 MG/DL (ref 0.5–1.4)
ERYTHROCYTE [DISTWIDTH] IN BLOOD BY AUTOMATED COUNT: 13.2 % (ref 11.5–14.5)
EST. GFR  (NO RACE VARIABLE): >60 ML/MIN/1.73 M^2
GLUCOSE SERPL-MCNC: 86 MG/DL (ref 70–110)
HCT VFR BLD AUTO: 44.5 % (ref 37–48.5)
HGB BLD-MCNC: 13.8 G/DL (ref 12–16)
IMM GRANULOCYTES # BLD AUTO: 0.02 K/UL (ref 0–0.04)
MCH RBC QN AUTO: 30.2 PG (ref 27–31)
MCHC RBC AUTO-ENTMCNC: 31 G/DL (ref 32–36)
MCV RBC AUTO: 97 FL (ref 82–98)
NEUTROPHILS # BLD AUTO: 4.6 K/UL (ref 1.8–7.7)
PLATELET # BLD AUTO: 245 K/UL (ref 150–450)
PMV BLD AUTO: 11.1 FL (ref 9.2–12.9)
POTASSIUM SERPL-SCNC: 3.8 MMOL/L (ref 3.5–5.1)
PROT SERPL-MCNC: 7 G/DL (ref 6–8.4)
RBC # BLD AUTO: 4.57 M/UL (ref 4–5.4)
SODIUM SERPL-SCNC: 142 MMOL/L (ref 136–145)
WBC # BLD AUTO: 6.18 K/UL (ref 3.9–12.7)

## 2024-12-13 PROCEDURE — 36415 COLL VENOUS BLD VENIPUNCTURE: CPT | Performed by: STUDENT IN AN ORGANIZED HEALTH CARE EDUCATION/TRAINING PROGRAM

## 2024-12-13 PROCEDURE — 80053 COMPREHEN METABOLIC PANEL: CPT | Performed by: STUDENT IN AN ORGANIZED HEALTH CARE EDUCATION/TRAINING PROGRAM

## 2024-12-13 PROCEDURE — 25000003 PHARM REV CODE 250: Performed by: STUDENT IN AN ORGANIZED HEALTH CARE EDUCATION/TRAINING PROGRAM

## 2024-12-13 PROCEDURE — 96375 TX/PRO/DX INJ NEW DRUG ADDON: CPT

## 2024-12-13 PROCEDURE — 63600175 PHARM REV CODE 636 W HCPCS: Performed by: STUDENT IN AN ORGANIZED HEALTH CARE EDUCATION/TRAINING PROGRAM

## 2024-12-13 PROCEDURE — 85027 COMPLETE CBC AUTOMATED: CPT | Performed by: STUDENT IN AN ORGANIZED HEALTH CARE EDUCATION/TRAINING PROGRAM

## 2024-12-13 PROCEDURE — 96367 TX/PROPH/DG ADDL SEQ IV INF: CPT

## 2024-12-13 PROCEDURE — 96415 CHEMO IV INFUSION ADDL HR: CPT

## 2024-12-13 PROCEDURE — 96413 CHEMO IV INFUSION 1 HR: CPT

## 2024-12-13 RX ORDER — EPINEPHRINE 0.3 MG/.3ML
0.3 INJECTION SUBCUTANEOUS ONCE AS NEEDED
Status: DISCONTINUED | OUTPATIENT
Start: 2024-12-13 | End: 2024-12-13 | Stop reason: HOSPADM

## 2024-12-13 RX ORDER — FAMOTIDINE 10 MG/ML
20 INJECTION INTRAVENOUS
Status: CANCELLED | OUTPATIENT
Start: 2024-12-14

## 2024-12-13 RX ORDER — HEPARIN 100 UNIT/ML
500 SYRINGE INTRAVENOUS
Status: DISCONTINUED | OUTPATIENT
Start: 2024-12-13 | End: 2024-12-13 | Stop reason: HOSPADM

## 2024-12-13 RX ORDER — PROCHLORPERAZINE EDISYLATE 5 MG/ML
5 INJECTION INTRAMUSCULAR; INTRAVENOUS ONCE AS NEEDED
Status: CANCELLED
Start: 2024-12-14

## 2024-12-13 RX ORDER — DIPHENHYDRAMINE HYDROCHLORIDE 50 MG/ML
50 INJECTION INTRAMUSCULAR; INTRAVENOUS ONCE AS NEEDED
Status: CANCELLED | OUTPATIENT
Start: 2024-12-14

## 2024-12-13 RX ORDER — DIPHENHYDRAMINE HYDROCHLORIDE 50 MG/ML
50 INJECTION INTRAMUSCULAR; INTRAVENOUS ONCE AS NEEDED
Status: DISCONTINUED | OUTPATIENT
Start: 2024-12-13 | End: 2024-12-13 | Stop reason: HOSPADM

## 2024-12-13 RX ORDER — FAMOTIDINE 10 MG/ML
20 INJECTION INTRAVENOUS
Status: COMPLETED | OUTPATIENT
Start: 2024-12-13 | End: 2024-12-13

## 2024-12-13 RX ORDER — EPINEPHRINE 0.3 MG/.3ML
0.3 INJECTION SUBCUTANEOUS ONCE AS NEEDED
Status: CANCELLED | OUTPATIENT
Start: 2024-12-14

## 2024-12-13 RX ORDER — SODIUM CHLORIDE 0.9 % (FLUSH) 0.9 %
10 SYRINGE (ML) INJECTION
Status: CANCELLED | OUTPATIENT
Start: 2024-12-14

## 2024-12-13 RX ORDER — SODIUM CHLORIDE 0.9 % (FLUSH) 0.9 %
10 SYRINGE (ML) INJECTION
Status: DISCONTINUED | OUTPATIENT
Start: 2024-12-13 | End: 2024-12-13 | Stop reason: HOSPADM

## 2024-12-13 RX ORDER — PROCHLORPERAZINE EDISYLATE 5 MG/ML
5 INJECTION INTRAMUSCULAR; INTRAVENOUS ONCE AS NEEDED
Status: DISCONTINUED | OUTPATIENT
Start: 2024-12-13 | End: 2024-12-13 | Stop reason: HOSPADM

## 2024-12-13 RX ORDER — HEPARIN 100 UNIT/ML
500 SYRINGE INTRAVENOUS
Status: CANCELLED | OUTPATIENT
Start: 2024-12-14

## 2024-12-13 RX ADMIN — FAMOTIDINE 20 MG: 10 INJECTION INTRAVENOUS at 12:12

## 2024-12-13 RX ADMIN — DEXAMETHASONE SODIUM PHOSPHATE 0.25 MG: 4 INJECTION, SOLUTION INTRA-ARTICULAR; INTRALESIONAL; INTRAMUSCULAR; INTRAVENOUS; SOFT TISSUE at 11:12

## 2024-12-13 RX ADMIN — DIPHENHYDRAMINE HYDROCHLORIDE 50 MG: 50 INJECTION, SOLUTION INTRAMUSCULAR; INTRAVENOUS at 12:12

## 2024-12-13 RX ADMIN — APREPITANT 130 MG: 130 INJECTION, EMULSION INTRAVENOUS at 11:12

## 2024-12-13 RX ADMIN — CARBOPLATIN 370 MG: 10 INJECTION INTRAVENOUS at 04:12

## 2024-12-13 RX ADMIN — SODIUM CHLORIDE: 9 INJECTION, SOLUTION INTRAVENOUS at 10:12

## 2024-12-13 RX ADMIN — PACLITAXEL 246 MG: 6 INJECTION, SOLUTION INTRAVENOUS at 12:12

## 2024-12-13 NOTE — PLAN OF CARE
Patient seated in chair accompanied by , VSS, assessment done. PIV inserted, flushed, blood return noted.  Had to wait for labs to result for doc to sign orders. Started NS @ 25 cc/hr KVO.  Labs resulted, doc signed orders.  WCTM for safety

## 2024-12-13 NOTE — PLAN OF CARE
Patient completed D1C1 Taxol & carbo, tolerated well.  PIV discontinued without issue.  Patient ambulated off floor accompanied by .

## 2024-12-16 ENCOUNTER — TELEPHONE (OUTPATIENT)
Dept: INFUSION THERAPY | Facility: HOSPITAL | Age: 79
End: 2024-12-16
Payer: MEDICARE

## 2024-12-16 ENCOUNTER — TELEPHONE (OUTPATIENT)
Dept: GYNECOLOGIC ONCOLOGY | Facility: CLINIC | Age: 79
End: 2024-12-16
Payer: MEDICARE

## 2024-12-16 NOTE — TELEPHONE ENCOUNTER
----- Message from SOPHIA Martell sent at 12/16/2024  3:48 PM CST -----  Regarding: FW: Change apt to virtual  Contact: Sharon    ----- Message -----  From: Aylin Marx  Sent: 12/16/2024   2:38 PM CST  To: [facility]  [facility]  Subject: Change apt to virtual                            Reschedule Existing Appointment     Current appt date:01/09/2024     Type of appt :Chemo     Physician:INONCTF     Reason for rescheduling: Would like an earlier date/ possible virtual      Caller:Sharon Garcia       Contact Preference:[phone] (Mobile), Requesting a call back.

## 2024-12-17 ENCOUNTER — TELEPHONE (OUTPATIENT)
Dept: GYNECOLOGIC ONCOLOGY | Facility: CLINIC | Age: 79
End: 2024-12-17
Payer: MEDICARE

## 2024-12-20 ENCOUNTER — TELEPHONE (OUTPATIENT)
Dept: GYNECOLOGIC ONCOLOGY | Facility: CLINIC | Age: 79
End: 2024-12-20
Payer: MEDICARE

## 2024-12-20 NOTE — TELEPHONE ENCOUNTER
----- Message from Cassandra sent at 12/20/2024  3:31 PM CST -----  Type: Patient call    Who called: Patient    Does the patient know what this is regarding? Requesting a call back in regards to she has a sore on her gums; her dentist want to prescribe magic mouthwash ; needs clearance to make sure its safe for her to have; please advise    Would the patient rather a call back or response via [facility]? Call    Best call back number: [phone]      Additional information:

## 2024-12-26 ENCOUNTER — OFFICE VISIT (OUTPATIENT)
Dept: GYNECOLOGIC ONCOLOGY | Facility: CLINIC | Age: 79
End: 2024-12-26
Payer: MEDICARE

## 2024-12-26 VITALS
RESPIRATION RATE: 17 BRPM | BODY MASS INDEX: 22.04 KG/M2 | HEART RATE: 85 BPM | DIASTOLIC BLOOD PRESSURE: 74 MMHG | TEMPERATURE: 99 F | SYSTOLIC BLOOD PRESSURE: 120 MMHG | OXYGEN SATURATION: 98 % | HEIGHT: 58 IN | WEIGHT: 105 LBS

## 2024-12-26 DIAGNOSIS — C54.1 ENDOMETRIAL CANCER: Primary | ICD-10-CM

## 2024-12-26 DIAGNOSIS — D84.821 IMMUNODEFICIENCY DUE TO DRUGS: ICD-10-CM

## 2024-12-26 DIAGNOSIS — Z79.899 IMMUNODEFICIENCY DUE TO DRUGS: ICD-10-CM

## 2024-12-26 PROCEDURE — 99215 OFFICE O/P EST HI 40 MIN: CPT | Mod: 24,S$GLB,, | Performed by: STUDENT IN AN ORGANIZED HEALTH CARE EDUCATION/TRAINING PROGRAM

## 2024-12-26 PROCEDURE — 3078F DIAST BP <80 MM HG: CPT | Mod: CPTII,S$GLB,, | Performed by: STUDENT IN AN ORGANIZED HEALTH CARE EDUCATION/TRAINING PROGRAM

## 2024-12-26 PROCEDURE — 3074F SYST BP LT 130 MM HG: CPT | Mod: CPTII,S$GLB,, | Performed by: STUDENT IN AN ORGANIZED HEALTH CARE EDUCATION/TRAINING PROGRAM

## 2024-12-26 PROCEDURE — 3288F FALL RISK ASSESSMENT DOCD: CPT | Mod: CPTII,S$GLB,, | Performed by: STUDENT IN AN ORGANIZED HEALTH CARE EDUCATION/TRAINING PROGRAM

## 2024-12-26 PROCEDURE — 99999 PR PBB SHADOW E&M-EST. PATIENT-LVL V: CPT | Mod: PBBFAC,,, | Performed by: STUDENT IN AN ORGANIZED HEALTH CARE EDUCATION/TRAINING PROGRAM

## 2024-12-26 PROCEDURE — 1159F MED LIST DOCD IN RCRD: CPT | Mod: CPTII,S$GLB,, | Performed by: STUDENT IN AN ORGANIZED HEALTH CARE EDUCATION/TRAINING PROGRAM

## 2024-12-26 PROCEDURE — 1101F PT FALLS ASSESS-DOCD LE1/YR: CPT | Mod: CPTII,S$GLB,, | Performed by: STUDENT IN AN ORGANIZED HEALTH CARE EDUCATION/TRAINING PROGRAM

## 2024-12-26 PROCEDURE — G2211 COMPLEX E/M VISIT ADD ON: HCPCS | Mod: S$GLB,,, | Performed by: STUDENT IN AN ORGANIZED HEALTH CARE EDUCATION/TRAINING PROGRAM

## 2024-12-26 PROCEDURE — 1126F AMNT PAIN NOTED NONE PRSNT: CPT | Mod: CPTII,S$GLB,, | Performed by: STUDENT IN AN ORGANIZED HEALTH CARE EDUCATION/TRAINING PROGRAM

## 2024-12-26 NOTE — PROGRESS NOTES
Referring Provider:  Wiedemann, Michael A., MD  [address] [place] [address]  [place] [address]   Subjective:      Patient ID: Sharon Garcia is a 79 y.o. female.    Chief Complaint: Chemotherapy (chemo)    Problem List Items Addressed This Visit       Endometrial cancer - Primary    Overview     Diagnosis:  IB uterine serous carcinoma    Surgery:  10/23/24: RA-TLH BSO SLN Omentectomy. 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 77% MI. Extensive LVSI. 0/2 SLN. Omentum negative. P53 abnormal, pMMR, Her2 3+. Tempus NGS: AKT2, ERBB2, TP53 pathogenic mutations.    Adjuvant therapy:  Carboplatin AUC 5 paclitaxel 175 mg/m2                 HPI Recovering well from surgery. Denies vaginal bleeding. Reports some bruising following surgery that has improved.     Met with Dr. Beltran and [facility] who recommended VBT in addition to chemotherapy.    Review of Systems   Constitutional:  Negative for chills, fatigue and fever.   Respiratory:  Negative for cough and shortness of breath.    Cardiovascular:  Negative for chest pain.   Gastrointestinal:  Negative for abdominal distention, abdominal pain, constipation and diarrhea.   Genitourinary:  Negative for dysuria, pelvic pain and vaginal bleeding.   Musculoskeletal:  Negative for back pain.   Psychiatric/Behavioral:  Negative for dysphoric mood. The patient is not nervous/anxious.      Past Medical History:   Diagnosis Date    Breast cancer     '91 Left, '93 right; alicia mastectomy, no chemo or radiation; no genetic testing    Lower back pain     Osteoporosis     Unspecified glaucoma       Past Surgical History:   Procedure Laterality Date    LYMPH NODE BIOPSY N/A 10/23/2024    Procedure: BIOPSY, LYMPH NODE;  Surgeon: Bob Quiroz MD;  Location: [facility];  Service: Gynecology Oncology;  Laterality: N/A;    MAPPING, LYMPH NODE, SENTINEL N/A 10/23/2024    Procedure: MAPPING, LYMPH NODE, SENTINEL;  Surgeon: Bob Quiroz MD;  Location: [facility];   Service: Gynecology Oncology;  Laterality: N/A;  correct code# 19651    MASTECTOMY Bilateral     ROBOT-ASSISTED LAPAROSCOPIC ABDOMINAL HYSTERECTOMY USING DA TANYA XI N/A 10/23/2024    Procedure: XI ROBOTIC HYSTERECTOMY;  Surgeon: Bob Quiroz MD;  Location: [facility];  Service: Gynecology Oncology;  Laterality: N/A;  2 hr case/ correct code# 62753    ROBOT-ASSISTED LAPAROSCOPIC OMENTECTOMY USING DA TANYA XI  10/23/2024    Procedure: XI ROBOTIC OMENTECTOMY;  Surgeon: Bob Quiroz MD;  Location: [facility];  Service: Gynecology Oncology;;    ROBOT-ASSISTED LAPAROSCOPIC SALPINGO-OOPHORECTOMY USING DA TANYA XI Bilateral 10/23/2024    Procedure: XI ROBOTIC SALPINGO-OOPHORECTOMY;  Surgeon: Bob Quiroz MD;  Location: [facility];  Service: Gynecology Oncology;  Laterality: Bilateral;  correct code# 23592      Family History   Problem Relation Name Age of Onset    Hypertension Mother      Other (sarcoma) Sister      Ovarian cancer Neg Hx      Uterine cancer Neg Hx      Breast cancer Neg Hx      Colon cancer Neg Hx        Social History     Socioeconomic History    Marital status:         Objective:      Vitals:    12/26/24 1040   BP: 120/74   Pulse: 85   Resp: 17   Temp: 98.6 °F (37 °C)        Physical Exam  Constitutional:       General: She is not in acute distress.  HENT:      Head: Normocephalic.   Eyes:      Extraocular Movements: Extraocular movements intact.      Conjunctiva/sclera: Conjunctivae normal.   Cardiovascular:      Rate and Rhythm: Normal rate.      Pulses: Normal pulses.   Pulmonary:      Effort: Pulmonary effort is normal. No respiratory distress.      Breath sounds: No wheezing.   Abdominal:      General: There is no distension.      Tenderness: There is no abdominal tenderness. There is no guarding or rebound.      Comments: Robotic incisions well healed.   Genitourinary:     Comments: Deferred      Musculoskeletal:         General: No deformity.   Neurological:      Mental  Status: She is alert and oriented to person, place, and time.   Psychiatric:         Mood and Affect: Mood normal.         Behavior: Behavior normal.         Thought Content: Thought content normal.         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 Preoperative imaging without evidence of metastatic disease and CA-125 was normal. Second opinion at [facility] with Dr. Beltran recommended VBT in addition to chemotherapy.  Surgery: RA-TL BSO SLND on 10/23/24.   Pathology: 77% MI, extensive LVSI. P53 mutated. MMR intact. HER2 IHC 3+  Adjuvant therapy: Carboplatin AUC 5 and paclitaxel 175 mg/m2 (Start date 12/13/24)  We again reviewed the potential side effects of chemotherapy including hematologic effects, alopecia, neuropathy, hypersensitivity reactions, arthralgias, nausea, and fatigue.   Return with CMP, CBC, and MD visit prior to C3. Ok to alternate future visits with Silva.  Refer to Radiation oncology for VBT        Visit today is associated with current or anticipated ongoing medical care related to this patient's single serious condition/complex condition: endometrial cancer.     Though this visit took place within the global post op period, counseling today covered issues not related to the surgery, specifically the prognosis of her pathology and the pros and cons of adjuvant treatment including chemotherapy, and monitoring for side effects of chemotherapy.    She is tolerating treatment without unexpected or unmanageable side effects. I will review all relevant labs and diagnostic tests prior to signing chemotherapy orders. Proceed with cycle #2 without modification or dose reduction (pending labs).             Bob GARCIA  MD [facility]

## 2024-12-26 NOTE — Clinical Note
C2 is on 1/3/24. I saw her today. Ok to schedule C3. She would also like a port. I ordered today. Could you please follow up to see that this is arranged? I let her know that it likely would not be before C2. I also placed a referral to rad onc for VBT.  Thanks, Bob

## 2024-12-27 ENCOUNTER — TELEPHONE (OUTPATIENT)
Dept: INTERVENTIONAL RADIOLOGY/VASCULAR | Facility: CLINIC | Age: 79
End: 2024-12-27
Payer: MEDICARE

## 2024-12-27 DIAGNOSIS — C54.1 ENDOMETRIAL CANCER: Primary | ICD-10-CM

## 2024-12-30 ENCOUNTER — TELEPHONE (OUTPATIENT)
Dept: INTERVENTIONAL RADIOLOGY/VASCULAR | Facility: CLINIC | Age: 79
End: 2024-12-30
Payer: MEDICARE

## 2024-12-31 ENCOUNTER — TELEPHONE (OUTPATIENT)
Dept: GYNECOLOGIC ONCOLOGY | Facility: CLINIC | Age: 79
End: 2024-12-31
Payer: MEDICARE

## 2024-12-31 NOTE — TELEPHONE ENCOUNTER
----- Message from Caitlin sent at 12/31/2024  9:30 AM CST -----  Regarding: chemo appt  Type:  Patient Returning Call      Name of who is calling:pt        What is request in detail:pt is requesting a call back in regards to the medications she takes for chemo, needs to make sure that they are sent to pharmacy so that she can have them the day of chemo which is 01/03/2025        Can clinic reply by [facility]:no        What number to call back if not in [facility]: [phone]

## 2025-01-02 ENCOUNTER — LAB VISIT (OUTPATIENT)
Dept: LAB | Facility: HOSPITAL | Age: 80
End: 2025-01-02
Attending: STUDENT IN AN ORGANIZED HEALTH CARE EDUCATION/TRAINING PROGRAM
Payer: MEDICARE

## 2025-01-02 DIAGNOSIS — Z51.12 ENCOUNTER FOR ANTINEOPLASTIC CHEMOTHERAPY AND IMMUNOTHERAPY: ICD-10-CM

## 2025-01-02 DIAGNOSIS — C54.1 MALIGNANT NEOPLASM OF ENDOMETRIUM: ICD-10-CM

## 2025-01-02 DIAGNOSIS — Z51.11 ENCOUNTER FOR ANTINEOPLASTIC CHEMOTHERAPY AND IMMUNOTHERAPY: ICD-10-CM

## 2025-01-02 LAB
ALBUMIN SERPL BCP-MCNC: 3.4 G/DL (ref 3.5–5.2)
ALP SERPL-CCNC: 70 U/L (ref 40–150)
ALT SERPL W/O P-5'-P-CCNC: 17 U/L (ref 10–44)
ANION GAP SERPL CALC-SCNC: 6 MMOL/L (ref 8–16)
AST SERPL-CCNC: 18 U/L (ref 10–40)
BILIRUB SERPL-MCNC: 0.3 MG/DL (ref 0.1–1)
BUN SERPL-MCNC: 17 MG/DL (ref 8–23)
CALCIUM SERPL-MCNC: 9.2 MG/DL (ref 8.7–10.5)
CANCER AG125 SERPL-ACNC: 18 U/ML (ref 0–30)
CHLORIDE SERPL-SCNC: 106 MMOL/L (ref 95–110)
CO2 SERPL-SCNC: 30 MMOL/L (ref 23–29)
CREAT SERPL-MCNC: 0.7 MG/DL (ref 0.5–1.4)
ERYTHROCYTE [DISTWIDTH] IN BLOOD BY AUTOMATED COUNT: 13.1 % (ref 11.5–14.5)
EST. GFR  (NO RACE VARIABLE): >60 ML/MIN/1.73 M^2
GLUCOSE SERPL-MCNC: 88 MG/DL (ref 70–110)
HCT VFR BLD AUTO: 39.8 % (ref 37–48.5)
HGB BLD-MCNC: 13.2 G/DL (ref 12–16)
IMM GRANULOCYTES # BLD AUTO: 0.03 K/UL (ref 0–0.04)
MCH RBC QN AUTO: 31 PG (ref 27–31)
MCHC RBC AUTO-ENTMCNC: 33.2 G/DL (ref 32–36)
MCV RBC AUTO: 93 FL (ref 82–98)
NEUTROPHILS # BLD AUTO: 2.7 K/UL (ref 1.8–7.7)
PLATELET # BLD AUTO: 182 K/UL (ref 150–450)
PMV BLD AUTO: 10.2 FL (ref 9.2–12.9)
POTASSIUM SERPL-SCNC: 4.4 MMOL/L (ref 3.5–5.1)
PROT SERPL-MCNC: 6.7 G/DL (ref 6–8.4)
RBC # BLD AUTO: 4.26 M/UL (ref 4–5.4)
SODIUM SERPL-SCNC: 142 MMOL/L (ref 136–145)
WBC # BLD AUTO: 4.41 K/UL (ref 3.9–12.7)

## 2025-01-02 PROCEDURE — 80053 COMPREHEN METABOLIC PANEL: CPT | Performed by: STUDENT IN AN ORGANIZED HEALTH CARE EDUCATION/TRAINING PROGRAM

## 2025-01-02 PROCEDURE — 36415 COLL VENOUS BLD VENIPUNCTURE: CPT | Performed by: STUDENT IN AN ORGANIZED HEALTH CARE EDUCATION/TRAINING PROGRAM

## 2025-01-02 PROCEDURE — 85027 COMPLETE CBC AUTOMATED: CPT | Performed by: STUDENT IN AN ORGANIZED HEALTH CARE EDUCATION/TRAINING PROGRAM

## 2025-01-02 PROCEDURE — 86304 IMMUNOASSAY TUMOR CA 125: CPT | Performed by: STUDENT IN AN ORGANIZED HEALTH CARE EDUCATION/TRAINING PROGRAM

## 2025-01-02 RX ORDER — DIPHENHYDRAMINE HYDROCHLORIDE 50 MG/ML
50 INJECTION INTRAMUSCULAR; INTRAVENOUS ONCE AS NEEDED
Status: CANCELLED | OUTPATIENT
Start: 2025-01-04

## 2025-01-02 RX ORDER — PROCHLORPERAZINE EDISYLATE 5 MG/ML
5 INJECTION INTRAMUSCULAR; INTRAVENOUS ONCE AS NEEDED
Status: CANCELLED
Start: 2025-01-04

## 2025-01-02 RX ORDER — FAMOTIDINE 10 MG/ML
20 INJECTION INTRAVENOUS
Status: CANCELLED | OUTPATIENT
Start: 2025-01-04

## 2025-01-02 RX ORDER — SODIUM CHLORIDE 0.9 % (FLUSH) 0.9 %
10 SYRINGE (ML) INJECTION
Status: CANCELLED | OUTPATIENT
Start: 2025-01-04

## 2025-01-02 RX ORDER — EPINEPHRINE 0.3 MG/.3ML
0.3 INJECTION SUBCUTANEOUS ONCE AS NEEDED
Status: CANCELLED | OUTPATIENT
Start: 2025-01-04

## 2025-01-02 RX ORDER — HEPARIN 100 UNIT/ML
500 SYRINGE INTRAVENOUS
Status: CANCELLED | OUTPATIENT
Start: 2025-01-04

## 2025-01-03 ENCOUNTER — INFUSION (OUTPATIENT)
Dept: INFUSION THERAPY | Facility: HOSPITAL | Age: 80
End: 2025-01-03
Payer: MEDICARE

## 2025-01-03 ENCOUNTER — TELEPHONE (OUTPATIENT)
Dept: PALLIATIVE MEDICINE | Facility: CLINIC | Age: 80
End: 2025-01-03
Payer: MEDICARE

## 2025-01-03 VITALS
TEMPERATURE: 99 F | SYSTOLIC BLOOD PRESSURE: 132 MMHG | WEIGHT: 104.94 LBS | RESPIRATION RATE: 18 BRPM | BODY MASS INDEX: 22.03 KG/M2 | HEIGHT: 58 IN | DIASTOLIC BLOOD PRESSURE: 64 MMHG | HEART RATE: 90 BPM

## 2025-01-03 DIAGNOSIS — C54.1 ENDOMETRIAL CANCER: Primary | ICD-10-CM

## 2025-01-03 PROCEDURE — 96413 CHEMO IV INFUSION 1 HR: CPT

## 2025-01-03 PROCEDURE — 25000003 PHARM REV CODE 250: Performed by: STUDENT IN AN ORGANIZED HEALTH CARE EDUCATION/TRAINING PROGRAM

## 2025-01-03 PROCEDURE — 96375 TX/PRO/DX INJ NEW DRUG ADDON: CPT

## 2025-01-03 PROCEDURE — 96367 TX/PROPH/DG ADDL SEQ IV INF: CPT

## 2025-01-03 PROCEDURE — 96417 CHEMO IV INFUS EACH ADDL SEQ: CPT

## 2025-01-03 PROCEDURE — 63600175 PHARM REV CODE 636 W HCPCS: Performed by: STUDENT IN AN ORGANIZED HEALTH CARE EDUCATION/TRAINING PROGRAM

## 2025-01-03 PROCEDURE — 96415 CHEMO IV INFUSION ADDL HR: CPT

## 2025-01-03 RX ORDER — EPINEPHRINE 0.3 MG/.3ML
0.3 INJECTION SUBCUTANEOUS ONCE AS NEEDED
Status: DISCONTINUED | OUTPATIENT
Start: 2025-01-03 | End: 2025-01-03 | Stop reason: HOSPADM

## 2025-01-03 RX ORDER — SODIUM CHLORIDE 0.9 % (FLUSH) 0.9 %
10 SYRINGE (ML) INJECTION
Status: DISCONTINUED | OUTPATIENT
Start: 2025-01-03 | End: 2025-01-03 | Stop reason: HOSPADM

## 2025-01-03 RX ORDER — DIPHENHYDRAMINE HYDROCHLORIDE 50 MG/ML
50 INJECTION INTRAMUSCULAR; INTRAVENOUS ONCE AS NEEDED
Status: DISCONTINUED | OUTPATIENT
Start: 2025-01-03 | End: 2025-01-03 | Stop reason: HOSPADM

## 2025-01-03 RX ORDER — PROCHLORPERAZINE EDISYLATE 5 MG/ML
5 INJECTION INTRAMUSCULAR; INTRAVENOUS ONCE AS NEEDED
Status: DISCONTINUED | OUTPATIENT
Start: 2025-01-03 | End: 2025-01-03 | Stop reason: HOSPADM

## 2025-01-03 RX ORDER — FAMOTIDINE 10 MG/ML
20 INJECTION INTRAVENOUS
Status: COMPLETED | OUTPATIENT
Start: 2025-01-03 | End: 2025-01-03

## 2025-01-03 RX ORDER — HEPARIN 100 UNIT/ML
500 SYRINGE INTRAVENOUS
Status: DISCONTINUED | OUTPATIENT
Start: 2025-01-03 | End: 2025-01-03 | Stop reason: HOSPADM

## 2025-01-03 RX ADMIN — FAMOTIDINE 20 MG: 10 INJECTION INTRAVENOUS at 12:01

## 2025-01-03 RX ADMIN — SODIUM CHLORIDE: 9 INJECTION, SOLUTION INTRAVENOUS at 11:01

## 2025-01-03 RX ADMIN — APREPITANT 130 MG: 130 INJECTION, EMULSION INTRAVENOUS at 11:01

## 2025-01-03 RX ADMIN — DEXAMETHASONE SODIUM PHOSPHATE 0.25 MG: 4 INJECTION, SOLUTION INTRA-ARTICULAR; INTRALESIONAL; INTRAMUSCULAR; INTRAVENOUS; SOFT TISSUE at 11:01

## 2025-01-03 RX ADMIN — CARBOPLATIN 370 MG: 10 INJECTION INTRAVENOUS at 03:01

## 2025-01-03 RX ADMIN — DIPHENHYDRAMINE HYDROCHLORIDE 50 MG: 50 INJECTION, SOLUTION INTRAMUSCULAR; INTRAVENOUS at 11:01

## 2025-01-03 RX ADMIN — PACLITAXEL 246 MG: 6 INJECTION, SOLUTION INTRAVENOUS at 12:01

## 2025-01-03 NOTE — PLAN OF CARE
1110 pt here for Taxol/Carbo infusion D1C2, labs, hx, meds, allergies reviewed, pt with no new complaints at this time, reclined in chair, warm blanket provided, continue to monitor

## 2025-01-03 NOTE — PLAN OF CARE
1600 pt tolerated Taxol/Carbo infusion without issue, pt to rtc 1/24/25, no distress noted upon d/c to home with

## 2025-01-06 ENCOUNTER — TELEPHONE (OUTPATIENT)
Dept: HEMATOLOGY/ONCOLOGY | Facility: CLINIC | Age: 80
End: 2025-01-06
Payer: MEDICARE

## 2025-01-06 ENCOUNTER — TELEPHONE (OUTPATIENT)
Dept: GYNECOLOGIC ONCOLOGY | Facility: CLINIC | Age: 80
End: 2025-01-06
Payer: MEDICARE

## 2025-01-06 NOTE — TELEPHONE ENCOUNTER
----- Message from Dietitian Esteban sent at 1/6/2025 11:19 AM CST -----  Regarding: FW: Appt R/S  Morning - would you mind assisting with this please?    Thanks,  Esteban LOVE  ----- Message -----  From: Mary Lou Pedersen  Sent: 1/6/2025  11:16 AM CST  To: Esteban Choudhary RD  Subject: Appt R/S                                         Reschedule Existing Appointment     Current appt date:1/9     Type of appt :Np     Physician: Esteban Choudhary RD     Reason for rescheduling:Patient is asking if appt can be scheduled as virtual due to not feeling well.      Caller:AYLEEN LEROY     Contact Preference: [phone]

## 2025-01-06 NOTE — TELEPHONE ENCOUNTER
Patient reports that during her last infusion, the iv was hurting and they did end up changing the site. However, she reports slight redness and tenderness. Discussed to [place] the redden area and continue to watch. Encourage heat packs as needed. Patient reports having facial flushing and her back is pink,  Reports that it happened after chemotherapy and not during the infusion. Discussed that those are some side effects that can happen and it's been 3 days since the infusion and reports no changes. Discussed infection precautions. VU.  Call for any other questions.

## 2025-01-06 NOTE — TELEPHONE ENCOUNTER
----- Message from Summer sent at 1/6/2025 11:20 AM CST -----  Regarding: advice  Type:  Patient Returning Call        Name of who is calling:pt        What is request in detail:pt is requesting a call back in regards to chemo treatment, pt states she had treatment Friday  and a problem with needles and vein. Pt states the spot is swelling and and on the pinkish side. Pt wants to know what to do.        Can clinic reply by [facility]:no      [phone]  [facility]

## 2025-01-06 NOTE — TELEPHONE ENCOUNTER
Spoke w/ pt in regards to switching yumiko to virtual for appt on 1/9/2025 with Esteban Choudhary RD. Pt approved to make the switch and stated pt's  will assist pt with logging on via portal for the appt. MA advised pt to please log on 10-15 mins prior to complete pre-ck questionnaires. Once questionnaires are completed, a green button should populate for  pt to join the virtual visit.      [place], [place] ext [phone]

## 2025-01-07 ENCOUNTER — TELEPHONE (OUTPATIENT)
Dept: HEMATOLOGY/ONCOLOGY | Facility: CLINIC | Age: 80
End: 2025-01-07
Payer: MEDICARE

## 2025-01-07 NOTE — TELEPHONE ENCOUNTER
----- Message from Arely sent at 1/7/2025  1:21 PM CST -----  Regarding: FW: Consult/Advisory  Contact: Sharon    ----- Message -----  From: Aylin Marx  Sent: 1/7/2025  12:02 PM CST  To: [facility]  [facility]  Subject: Consult/Advisory                                 Consult/Advisory     Name Of Caller:Sharon Garcia          Contact Preference:[phone] (Mobile) , requesting a call back.        Nature of call:Pt has questions for appt coming up on 01/09/2025, with dietician Esteban.

## 2025-01-07 NOTE — TELEPHONE ENCOUNTER
Spoke with patient in which she had questions regarding her per-check questionnaire. Patient was advise to answer questions for how she during now and she can update RD upon virtual visit Thursday

## 2025-01-08 ENCOUNTER — TELEPHONE (OUTPATIENT)
Dept: HEMATOLOGY/ONCOLOGY | Facility: CLINIC | Age: 80
End: 2025-01-08
Payer: MEDICARE

## 2025-01-08 NOTE — PROGRESS NOTES
The patient location is: [place]  The chief complaint leading to consultation is: nutrition during chemo      Visit type: audiovisual    Face to Face time with patient: 45  45 minutes of total time spent on the encounter, which includes face to face time and non-face to face time preparing to see the patient (eg, review of tests), Obtaining and/or reviewing separately obtained history, Documenting clinical information in the electronic or other health record, Independently interpreting results (not separately reported) and communicating results to the patient/family/caregiver, or Care coordination (not separately reported).     Each patient to whom he or she provides medical services by telemedicine is:  (1) informed of the relationship between the physician and patient and the respective role of any other health care provider with respect to management of the patient; and (2) notified that he or she may decline to receive medical services by telemedicine and may withdraw from such care at any time.    Notes:    Oncology Nutrition Assessment Medical Nutrition Therapy Initial Visit    Sharon Garcia  1945    Referring Provider: Silva Alcantara*     Diagnosis: Malignant neoplasm of endometrium   Treatment: OP PACLITAXEL CARBOPLATIN (AUC 5) Q3W 12/13/2024 to 11/15/2025 - Dr. Quiroz  Additional Treatment: RA-TLH BSO SLN Omentectomy on 10/23/24    PMHx:   Past Medical History:   Diagnosis Date    Breast cancer     '91 Left, '93 right; alicia mastectomy, no chemo or radiation; no genetic testing    Lower back pain     Osteoporosis     Unspecified glaucoma      Allergies: Penicillins    Nutrition Assessment    Anthropometrics:   Weight:   Wt Readings from Last 10 Encounters:   01/03/25 47.6 kg (104 lb 15 oz)   12/26/24 47.6 kg (105 lb)   12/13/24 47.7 kg (105 lb 2.6 oz)   11/07/24 48.6 kg (107 lb 2.3 oz)   10/23/24 47.6 kg (105 lb)   10/16/24 47.6 kg (105 lb)   10/10/24 48 kg (105 lb 13.1 oz)  "  09/30/24 48.6 kg (107 lb 1.6 oz)   08/27/24 48.4 kg (106 lb 9.5 oz)   04/27/21 47.6 kg (105 lb)                             Ht Readings from Last 1 Encounters:   01/03/25 4' 10" (1.473 m)      BMI Readings from Last 1 Encounters:   01/03/25 21.93 kg/m²     Estimated body surface area is 1.4 meters squared as calculated from the following:    Height as of 1/3/25: 4' 10" (1.473 m).    Weight as of 1/3/25: 47.6 kg (104 lb 15 oz).      Appetite: decreased  Intake: Regular Diet, Lactose Intolerance. Eating 3 small meals daily, mid morning and afternoon snack daily. Drinking Ensure or Boost once daily. Drinking 8 glasses of water daily. Fresh OJ 1 cup daily.   Breakfast: 2 eggs, butter, and cheese.  Lunch: roasted turkey with potato salad and avocado or turkey with wheat tortilla, freeman, and mozzarella cheese  Snacks: nuts, apple with peanut butter, or sips of Ensure.   Dinner: 4 oz salmon, corn on the hartmann, potato salad, and cucumber with avocado and tomato.    Encounter Notes:  Sharon Garcia is a 79 y.o. female with Malignant neoplasm of endometrium referred by Silva Alcantara* for nutrition assessment and counseling. Patient presents to virtual visit accompanied by her  . Weight stable. Current nutrition impact symptoms listed below.     Nutrition Impact Symptoms    [] No nutritional concerns at current  [x] Poor Appetite   [] Weight loss   [] Nausea   [] Vomiting   [] Diarrhea   [x] Constipation - prune and Miralax.  [x] Early Satiety [] Change in smell   [] Change in taste   [x] Dry Mouth   [] Thick saliva   [] Dysphagia   [] Difficulty chewing  [] Mucositis  [] Indigestion  [x] Gas/Bloating - with dairy  [] Reflux      [x] Fatigue - lightheaded and a little confused.    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      Current Medications:  Current Outpatient Medications   Medication Instructions    acetaminophen (TYLENOL) 1,000 mg, Oral, Every 6 hours, " Alternate with ibuprofen every 3 hours as needed for pain.    calcium citrate-vitamin D3 315-200 mg (CITRACAL+D) 315 mg-5 mcg (200 unit) per tablet 1 tablet, 2 times daily    denosumab (PROLIA SUBQ) Inject into the skin.    dexAMETHasone (DECADRON) 8 mg, Oral, Daily, For three days starting on the day after your chemotherapy treatment.    ibuprofen (ADVIL,MOTRIN) 600 mg, Oral, Every 6 hours, Alternate with acetaminophen every 3 hours as needed for pain.    Lactobacillus rhamnosus GG (CULTURELLE) 10 billion cell capsule 1 capsule, Daily    latanoprost 0.005 % ophthalmic solution Place into both eyes.    multivit with minerals/lutein (MULTIVITAMIN 50 PLUS ORAL) Take by mouth.    OLANZapine (ZYPREXA) 5 mg, Oral, Nightly, Take for 4 nights starting on the night of your chemotherapy treatment.    oxyCODONE (ROXICODONE) 5 mg, Oral, Every 4 hours PRN    prochlorperazine (COMPAZINE) 5 mg, Oral, Every 6 hours PRN    senna-docusate 8.6-50 mg (SENNA WITH DOCUSATE SODIUM) 8.6-50 mg per tablet 2 tablets, Oral, Daily PRN, Continue this medication until you are no longer taking narcotics (oxycodone).    zinc gluconate 50 mg, Daily     Labs: Reviewed 1/2/25: alb 3.4     Nutrition Diagnosis    Nutrition Problem: Food and nutrition related knowledge deficit  Etiology (related to): lack of prior need for nutrition education  Signs/Symptoms (as evidenced by): new cancer diagnosis and new start chemo    Nutrition Intervention    Nutrition Prescription  0848-0447 kcals/day 30-35 kcal/kg   48-58 g protein/day 1-1.2 gm/kg  3947-1962 mL fluid/day 1 ml/kcal    Recommendations:   Make food safety a priority during active treatment to prevent exposure to food bourne illnesses. Reviewed guidelines during encounter.  Reviewed RD role in oncology care team during active treatment including but not limited to management of nutrition impact side effects of treatment and weight maintenance during treatment.   Samples of ENJORE Standard 1.4  (yayo) requested on 01/09/25  Recommend Eating Well Through Cancer cookbook  Encouraged exercise with treatment.     Materials Provided/Reviewed: none    Nutrition Monitoring and Evaluation    Monitor: energy intake, weight, and diet education needs     Follow up In 1 months    Communication to referring provider/care team: Note available in chart.     Consultation Time: 45 Minutes    Esteban DELGADO, , LDN  Advanced Practice in Clinical Nutrition  Board Certified Specialist in Oncology Nutrition   [facility]  [phone]

## 2025-01-09 ENCOUNTER — CLINICAL SUPPORT (OUTPATIENT)
Dept: HEMATOLOGY/ONCOLOGY | Facility: CLINIC | Age: 80
End: 2025-01-09
Payer: MEDICARE

## 2025-01-09 DIAGNOSIS — Z71.3 NUTRITIONAL COUNSELING: Primary | ICD-10-CM

## 2025-01-09 DIAGNOSIS — Z51.12 ENCOUNTER FOR ANTINEOPLASTIC CHEMOTHERAPY AND IMMUNOTHERAPY: ICD-10-CM

## 2025-01-09 DIAGNOSIS — C54.1 ENDOMETRIAL CANCER: ICD-10-CM

## 2025-01-09 DIAGNOSIS — C54.1 MALIGNANT NEOPLASM OF ENDOMETRIUM: ICD-10-CM

## 2025-01-09 DIAGNOSIS — Z51.11 ENCOUNTER FOR ANTINEOPLASTIC CHEMOTHERAPY AND IMMUNOTHERAPY: ICD-10-CM

## 2025-01-09 DIAGNOSIS — Z90.710 S/P LAPAROSCOPIC HYSTERECTOMY: ICD-10-CM

## 2025-01-10 ENCOUNTER — TELEPHONE (OUTPATIENT)
Dept: GYNECOLOGIC ONCOLOGY | Facility: CLINIC | Age: 80
End: 2025-01-10
Payer: MEDICARE

## 2025-01-10 NOTE — TELEPHONE ENCOUNTER
----- Message from Admatic sent at 1/10/2025  1:05 PM CST -----  Name of Who is Calling: AYLEEN LEROY [[id number]]          What is the request in detail: Pt is requesting a call back regarding a bruise and itching on her arm from her chemo. Pt advised swellen has gone down but still a pinkish color. Pt would like to know if she should be concerned. Please assist.           Can the clinic reply by [facility]: No          What [facility] Back [facility]:   [phone]

## 2025-01-10 NOTE — TELEPHONE ENCOUNTER
Verified name/. Called with concerns regarding her last chemo infusion. Reports that her arm has improved with swelling, arm slightly pink and asked if she should be concerned. Discuss as long it is not hot to touch, angry red, or if she is having fevers >100.4. Denies any symptoms. Reports only tenderness. Reports waking up in the middle of the night with itching. Explained that she can take over the counter Benadryl for itching and reports having headache now and then. Report she is taking Tylenol. Discuss that is good to take. Listened to her concerns. VU. No other questions asked.

## 2025-01-14 ENCOUNTER — TELEPHONE (OUTPATIENT)
Dept: GYNECOLOGIC ONCOLOGY | Facility: CLINIC | Age: 80
End: 2025-01-14
Payer: MEDICARE

## 2025-01-17 ENCOUNTER — TELEPHONE (OUTPATIENT)
Dept: GYNECOLOGIC ONCOLOGY | Facility: CLINIC | Age: 80
End: 2025-01-17
Payer: MEDICARE

## 2025-01-17 NOTE — TELEPHONE ENCOUNTER
----- Message from Stella sent at 1/17/2025  3:05 PM CST -----  Contact: [phone]  Patient is calling regarding her appointment for her port that was given to her on a printout for 1/21/2025

## 2025-01-20 ENCOUNTER — PATIENT MESSAGE (OUTPATIENT)
Dept: HEMATOLOGY/ONCOLOGY | Facility: CLINIC | Age: 80
End: 2025-01-20
Payer: MEDICARE

## 2025-01-22 ENCOUNTER — TELEPHONE (OUTPATIENT)
Dept: GYNECOLOGIC ONCOLOGY | Facility: CLINIC | Age: 80
End: 2025-01-22
Payer: MEDICARE

## 2025-01-23 ENCOUNTER — OFFICE VISIT (OUTPATIENT)
Dept: GYNECOLOGIC ONCOLOGY | Facility: CLINIC | Age: 80
End: 2025-01-23
Payer: MEDICARE

## 2025-01-23 DIAGNOSIS — Z79.899 IMMUNODEFICIENCY DUE TO DRUGS: ICD-10-CM

## 2025-01-23 DIAGNOSIS — C54.1 ENDOMETRIAL CANCER: Primary | ICD-10-CM

## 2025-01-23 DIAGNOSIS — D84.821 IMMUNODEFICIENCY DUE TO DRUGS: ICD-10-CM

## 2025-01-23 PROCEDURE — G2211 COMPLEX E/M VISIT ADD ON: HCPCS | Mod: 95,,, | Performed by: STUDENT IN AN ORGANIZED HEALTH CARE EDUCATION/TRAINING PROGRAM

## 2025-01-23 PROCEDURE — 98007 SYNCH AUDIO-VIDEO EST HI 40: CPT | Mod: 95,,, | Performed by: STUDENT IN AN ORGANIZED HEALTH CARE EDUCATION/TRAINING PROGRAM

## 2025-01-27 ENCOUNTER — PATIENT MESSAGE (OUTPATIENT)
Dept: INTERVENTIONAL RADIOLOGY/VASCULAR | Facility: HOSPITAL | Age: 80
End: 2025-01-27
Payer: MEDICARE

## 2025-01-27 NOTE — NURSING
Pre-procedure call complete.  2 patient identifier used (name and ).   Arrival time 1230.  Patient instructed not to eat or drink anything after midnight the night before procedure.  Patient aware will need someone to provide transport home and monitor pt 8 hours post procedure.  No driving for at least 24 hours after procedure.    Patient verbalized aware of which medications to take.  Do not take  sleep medication (including OTC) and anxiety medication the night before procedure.  Arrival time and location given.  Expected length of stay reviewed.  Covid screening completed.  Patient verbalized understanding of all pre-procedure instructions.  Written instructions and directions sent to patient in Mychart/portal.

## 2025-01-28 ENCOUNTER — TELEPHONE (OUTPATIENT)
Dept: GYNECOLOGIC ONCOLOGY | Facility: CLINIC | Age: 80
End: 2025-01-28
Payer: MEDICARE

## 2025-01-28 ENCOUNTER — LAB VISIT (OUTPATIENT)
Dept: LAB | Facility: HOSPITAL | Age: 80
End: 2025-01-28
Payer: MEDICARE

## 2025-01-28 DIAGNOSIS — C54.1 ENDOMETRIAL CANCER: ICD-10-CM

## 2025-01-28 DIAGNOSIS — D49.9 NEOPLASM: ICD-10-CM

## 2025-01-28 LAB
INR PPP: 1 (ref 0.8–1.2)
PROTHROMBIN TIME: 10.6 SEC (ref 9–12.5)

## 2025-01-28 PROCEDURE — 36415 COLL VENOUS BLD VENIPUNCTURE: CPT

## 2025-01-28 PROCEDURE — 85610 PROTHROMBIN TIME: CPT

## 2025-01-29 ENCOUNTER — TELEPHONE (OUTPATIENT)
Dept: HEMATOLOGY/ONCOLOGY | Facility: CLINIC | Age: 80
End: 2025-01-29
Payer: MEDICARE

## 2025-01-29 ENCOUNTER — HOSPITAL ENCOUNTER (OUTPATIENT)
Dept: INTERVENTIONAL RADIOLOGY/VASCULAR | Facility: HOSPITAL | Age: 80
Discharge: HOME | End: 2025-01-29
Attending: STUDENT IN AN ORGANIZED HEALTH CARE EDUCATION/TRAINING PROGRAM
Payer: MEDICARE

## 2025-01-29 VITALS
BODY MASS INDEX: 23.3 KG/M2 | WEIGHT: 108 LBS | SYSTOLIC BLOOD PRESSURE: 156 MMHG | HEIGHT: 57 IN | DIASTOLIC BLOOD PRESSURE: 67 MMHG | TEMPERATURE: 98 F | OXYGEN SATURATION: 97 % | RESPIRATION RATE: 18 BRPM | HEART RATE: 95 BPM

## 2025-01-29 DIAGNOSIS — C54.1 ENDOMETRIAL CANCER: ICD-10-CM

## 2025-01-29 PROCEDURE — 63600175 PHARM REV CODE 636 W HCPCS: Performed by: RADIOLOGY

## 2025-01-29 PROCEDURE — 99900035 HC TECH TIME PER 15 MIN (STAT)

## 2025-01-29 PROCEDURE — 94760 N-INVAS EAR/PLS OXIMETRY 1: CPT

## 2025-01-29 PROCEDURE — 94761 N-INVAS EAR/PLS OXIMETRY MLT: CPT

## 2025-01-29 RX ORDER — ONDANSETRON HYDROCHLORIDE 2 MG/ML
4 INJECTION, SOLUTION INTRAVENOUS EVERY 6 HOURS PRN
Status: DISCONTINUED | OUTPATIENT
Start: 2025-01-29 | End: 2025-01-30 | Stop reason: HOSPADM

## 2025-01-29 RX ORDER — HEPARIN SODIUM 1000 [USP'U]/ML
INJECTION, SOLUTION INTRAVENOUS; SUBCUTANEOUS
Status: COMPLETED | OUTPATIENT
Start: 2025-01-29 | End: 2025-01-29

## 2025-01-29 RX ORDER — SODIUM CHLORIDE 9 MG/ML
INJECTION, SOLUTION INTRAVENOUS CONTINUOUS
Status: DISCONTINUED | OUTPATIENT
Start: 2025-01-29 | End: 2025-01-30 | Stop reason: HOSPADM

## 2025-01-29 RX ORDER — LIDOCAINE HYDROCHLORIDE 10 MG/ML
1 INJECTION, SOLUTION EPIDURAL; INFILTRATION; INTRACAUDAL; PERINEURAL ONCE
Status: DISCONTINUED | OUTPATIENT
Start: 2025-01-29 | End: 2025-01-30 | Stop reason: HOSPADM

## 2025-01-29 RX ORDER — FENTANYL CITRATE 50 UG/ML
INJECTION, SOLUTION INTRAMUSCULAR; INTRAVENOUS
Status: COMPLETED | OUTPATIENT
Start: 2025-01-29 | End: 2025-01-29

## 2025-01-29 RX ORDER — MIDAZOLAM HYDROCHLORIDE 1 MG/ML
INJECTION, SOLUTION INTRAMUSCULAR; INTRAVENOUS
Status: COMPLETED | OUTPATIENT
Start: 2025-01-29 | End: 2025-01-29

## 2025-01-29 RX ADMIN — HEPARIN SODIUM 500 UNITS: 1000 INJECTION, SOLUTION INTRAVENOUS; SUBCUTANEOUS at 01:01

## 2025-01-29 RX ADMIN — MIDAZOLAM HYDROCHLORIDE 1.5 MG: 1 INJECTION, SOLUTION INTRAMUSCULAR; INTRAVENOUS at 01:01

## 2025-01-29 RX ADMIN — NITROGLYCERIN 200 MCG: 20 INJECTION INTRAVENOUS at 01:01

## 2025-01-29 RX ADMIN — FENTANYL CITRATE 50 MCG: 50 INJECTION, SOLUTION INTRAMUSCULAR; INTRAVENOUS at 01:01

## 2025-01-29 NOTE — PLAN OF CARE
Pt Resting in bed no s/s of distress RR even and unlabored VSS. Discharge instructions given and pt verbalized. IV D/C. Pt ready for discharge home

## 2025-01-29 NOTE — Clinical Note
Left: Chest.   Scrubbed with Chlorohexidine.   Painted with Chlorohexidine.  Skin prep dry before draping.

## 2025-01-29 NOTE — DISCHARGE INSTRUCTIONS
*PLEASE READ CAREFULLY!!*      Your port insertion/removal site is dressed with gauze and Tega-Derm (looks like Saran Wrap). Underneath the dressing is Derma-Bond (skin glue) and Steri-Strips (looks like strips of white tape). There are also sutures, or stitches, on the inside - these will dissolve on their own.    Do NOT remove the dressing today. Do NOT shower/bathe tonight.     You can shower/bathe tomorrow night. Leave the dressing ON during your shower/bath. Turn the site away from the shower's spray; if you bathe, do NOT allow the site underwater. After your shower/bath, you may remove the dressing. Gently pat the site dry.    The site may remained uncovered. Allow the Steri-Strips and Derma-Bond to come off on their own. You can use a large pad-style bandage, or a 4x4 gauze dressing and tape to cover the site if your clothing (or bra straps) rubbing against it is uncomfortable to you. These items can be found at your local pharmacy or grocery/shopping center.    Until the site heals, usually within one to two weeks, DO NOT submerge in a bath tub, swimming pool, or jacuzzi. DO NOT lift/carry anything heavier than a gallon of milk. DO NOT reach excessively or repeatedly above your head.    Hand hygiene is VERY important! Anyone, including you, who touches the port site should do so with CLEAN hands.         For INSERTIONS: Only trained personnel should access your port. At the end of whatever activity involving your port is completed (chemo, other infusion, blood draw, etc), it should be flushed with a medicine called Heparin.    Interventional Radiology Clinic    [phone], choose prompt 3, Monday - Friday, 8:00 am - 4:00 pm    [phone] After hours and on holidays. Ask to speak with the interventional radiologist on call.

## 2025-01-29 NOTE — TELEPHONE ENCOUNTER
----- Message from Dietitialdair Orellana sent at 1/29/2025 10:19 AM CST -----  Regarding: FW: Scheduling Request  Contact: [phone]    ----- Message -----  From: Dorene Ramírez  Sent: 1/29/2025  10:05 AM CST  To: Esteban Choudhary RD  Subject: Scheduling Request                               Scheduling Request           Appt Type:  Np      Date/Time Preference: Next available     Caller Name: pt      Contact Preference:  [phone]    Comment: Would like to r/s 2/5 visit due to conflict. Please call to advise thank you

## 2025-01-29 NOTE — DISCHARGE SUMMARY
Radiology Discharge Summary      Hospital Course: No complications    Admit Date: 1/29/2025  Discharge Date: 01/29/2025     Instructions Given to Patient: Yes  Diet: Resume prior diet  Activity: activity as tolerated and no driving for today    Description of Condition on Discharge: Stable  Vital Signs (Most Recent): Temp: 98.1 °F (36.7 °C) (01/29/25 1245)  Pulse: 86 (01/29/25 1245)  Resp: 19 (01/29/25 1245)  BP: (!) 168/88 (01/29/25 1245)  SpO2: 98 % (01/29/25 1245)    Discharge Disposition: Home    Discharge Diagnosis: Endometrial cancer s/p right chest port placement    Follow up: As scheduled    Manfred Wheat M.D.  Interventional Radiology  Department of Radiology

## 2025-01-29 NOTE — PLAN OF CARE
Pt in preop bay 30, VSS, meds given and IV inserted. Pt denies any open wounds on body or the use of any weight loss injections. Procedural consents verified with pt.

## 2025-01-29 NOTE — H&P
Radiology History & Physical      SUBJECTIVE:     Chief Complaint: Endometrial cancer    History of Present Illness:  Sharon Garcia is a 79 y.o. female who presents for right chest port placement.  Past Medical History:   Diagnosis Date    Breast cancer     '91 Left, '93 right; alicia mastectomy, no chemo or radiation; no genetic testing    Lower back pain     Osteoporosis     Unspecified glaucoma      Past Surgical History:   Procedure Laterality Date    LYMPH NODE BIOPSY N/A 10/23/2024    Procedure: BIOPSY, LYMPH NODE;  Surgeon: Bob Quiroz MD;  Location: [facility];  Service: Gynecology Oncology;  Laterality: N/A;    MAPPING, LYMPH NODE, SENTINEL N/A 10/23/2024    Procedure: MAPPING, LYMPH NODE, SENTINEL;  Surgeon: Bob Quiroz MD;  Location: [facility];  Service: Gynecology Oncology;  Laterality: N/A;  correct code# 69899    MASTECTOMY Bilateral     ROBOT-ASSISTED LAPAROSCOPIC ABDOMINAL HYSTERECTOMY USING DA TANYA XI N/A 10/23/2024    Procedure: XI ROBOTIC HYSTERECTOMY;  Surgeon: Bob Quiroz MD;  Location: [facility];  Service: Gynecology Oncology;  Laterality: N/A;  2 hr case/ correct code# 81963    ROBOT-ASSISTED LAPAROSCOPIC OMENTECTOMY USING DA TANYA XI  10/23/2024    Procedure: XI ROBOTIC OMENTECTOMY;  Surgeon: Bob Quiroz MD;  Location: [facility];  Service: Gynecology Oncology;;    ROBOT-ASSISTED LAPAROSCOPIC SALPINGO-OOPHORECTOMY USING DA TANYA XI Bilateral 10/23/2024    Procedure: XI ROBOTIC SALPINGO-OOPHORECTOMY;  Surgeon: Bob Quiroz MD;  Location: [facility];  Service: Gynecology Oncology;  Laterality: Bilateral;  correct code# 88644       Home Meds:   Prior to Admission medications    Medication Sig Start Date End Date Taking? Authorizing Provider   acetaminophen (TYLENOL) 500 MG tablet Take 2 tablets (1,000 mg total) by mouth every 6 (six) hours. Alternate with ibuprofen every 3 hours as needed for pain. 10/23/24   Mavis Beasley MD   calcium citrate-vitamin  D3 315-200 mg (CITRACAL+D) 315 mg-5 mcg (200 unit) per tablet Take 1 tablet by mouth 2 (two) times daily.    Provider, Historical   denosumab (PROLIA SUBQ) Inject into the skin.    Provider, Historical   dexAMETHasone (DECADRON) 4 MG Tab Take 2 tablets (8 mg total) by mouth once daily. For three days starting on the day after your chemotherapy treatment. 11/15/24   Silva Alcantara NP   ibuprofen (ADVIL,MOTRIN) 600 MG tablet Take 1 tablet (600 mg total) by mouth every 6 (six) hours. Alternate with acetaminophen every 3 hours as needed for pain. 10/23/24   Mavis Beasley MD   Lactobacillus rhamnosus GG (CULTURELLE) 10 billion cell capsule Take 1 capsule by mouth once daily.    Provider, Historical   latanoprost 0.005 % ophthalmic solution Place into both eyes.    Provider, Historical   multivit with minerals/lutein (MULTIVITAMIN 50 PLUS ORAL) Take by mouth.    Provider, Historical   OLANZapine (ZYPREXA) 5 MG tablet Take 1 tablet (5 mg total) by mouth nightly. Take for 4 nights starting on the night of your chemotherapy treatment. 11/15/24 11/15/25  Silva Alcantara NP   oxyCODONE (ROXICODONE) 5 MG immediate release tablet Take 1 tablet (5 mg total) by mouth every 4 (four) hours as needed for Pain. 10/23/24   Mavis Beasley MD   prochlorperazine (COMPAZINE) 5 MG tablet Take 1 tablet (5 mg total) by mouth every 6 (six) hours as needed for Nausea. 11/15/24 11/15/25  Silva Alcantara NP   senna-docusate 8.6-50 mg (SENNA WITH DOCUSATE SODIUM) 8.6-50 mg per tablet Take 2 tablets by mouth daily as needed for Constipation. Continue this medication until you are no longer taking narcotics (oxycodone). 10/23/24   Mavis Beasley MD   zinc gluconate 50 mg tablet Take 50 mg by mouth once daily.    Provider, Historical     Anticoagulants/Antiplatelets: no anticoagulation    Allergies:   Review of patient's allergies indicates:   Allergen Reactions    Penicillins Hives     Sedation History:   no adverse reactions    Review of Systems:   Hematological: no known coagulopathies  Respiratory: no shortness of breath  Cardiovascular: no chest pain  Gastrointestinal: no abdominal pain  Genito-Urinary: no dysuria  Musculoskeletal: negative  Neurological: no TIA or stroke symptoms         OBJECTIVE:     Vital Signs (Most Recent)       Physical Exam:  ASA: 3  Mallampati: 2    General: no acute distress  Mental Status: alert and oriented to person, place and time  HEENT: normocephalic, atraumatic  Chest: unlabored breathing  Heart: regular heart rate  Abdomen: nondistended  Extremity: moves all extremities    Laboratory  Lab Results   Component Value Date    INR 1.0 01/28/2025       Lab Results   Component Value Date    WBC 4.41 01/02/2025    HGB 13.2 01/02/2025    HCT 39.8 01/02/2025    MCV 93 01/02/2025     01/02/2025      Lab Results   Component Value Date    GLU 88 01/02/2025     01/02/2025    K 4.4 01/02/2025     01/02/2025    CO2 30 (H) 01/02/2025    BUN 17 01/02/2025    CREATININE 0.7 01/02/2025    CALCIUM 9.2 01/02/2025    ALT 17 01/02/2025    AST 18 01/02/2025    ALBUMIN 3.4 (L) 01/02/2025    BILITOT 0.3 01/02/2025       ASSESSMENT/PLAN:     Sedation Plan: Moderate  Patient will undergo right chest port placement.    Manfred Wheat M.D.  Interventional Radiology  Department of Radiology

## 2025-01-29 NOTE — PROCEDURES
Radiology Post-Procedure Note    Pre Op Diagnosis: Endometrial cancer    Post Op Diagnosis: Same    Procedure: PORT placement    Procedure performed by: Manfred Wheat MD    Written Informed Consent Obtained: Yes    Specimen Removed: No    Estimated Blood Loss: Minimal    Findings:   Using realtime U/S guidance an 8 Fr port catheter was placed into the right IJ vein with tip of the catheter in the RA.    Port is ready for use.     Manfred Wheat M.D.  Interventional Radiology  Department of Radiology

## 2025-02-03 ENCOUNTER — TELEPHONE (OUTPATIENT)
Dept: GYNECOLOGIC ONCOLOGY | Facility: CLINIC | Age: 80
End: 2025-02-03
Payer: MEDICARE

## 2025-02-03 NOTE — TELEPHONE ENCOUNTER
"----- Message from SOPHIA Herman sent at 2/3/2025  2:18 PM CST -----    ----- Message -----  From: Maik Dinh  Sent: 2/3/2025   1:56 PM CST  To: Bob Quiroz Staff    Consult/Advisory    Name Of Caller: Self    Contact Preference?: [phone]     Provider Name: Richie    What is the nature of the call?: Requesting clarification regarding 2/5 and 2/7 appts    Additional Notes:  "Thank you for all that you do for our patients"  "

## 2025-02-03 NOTE — PROGRESS NOTES
The patient location is: [place]  The chief complaint leading to consultation is: nutrition during chemo      Visit type: audiovisual    Face to Face time with patient: 20  30 minutes of total time spent on the encounter, which includes face to face time and non-face to face time preparing to see the patient (eg, review of tests), Obtaining and/or reviewing separately obtained history, Documenting clinical information in the electronic or other health record, Independently interpreting results (not separately reported) and communicating results to the patient/family/caregiver, or Care coordination (not separately reported).     Each patient to whom he or she provides medical services by telemedicine is:  (1) informed of the relationship between the physician and patient and the respective role of any other health care provider with respect to management of the patient; and (2) notified that he or she may decline to receive medical services by telemedicine and may withdraw from such care at any time.    Notes:    Oncology Nutrition Assessment Medical Nutrition Therapy Follow-up Visit    Sharon Garcia  1945    Diagnosis: Malignant neoplasm of endometrium   Treatment: OP PACLITAXEL CARBOPLATIN (AUC 5) Q3W 12/13/2024 to 11/15/2025 - Dr. Quiroz  Additional Treatment: -[facility] BSO SLN Omentectomy on 10/23/24    PMHx:   Past Medical History:   Diagnosis Date    Breast cancer     '91 Left, '93 right; alicia mastectomy, no chemo or radiation; no genetic testing    Lower back pain     Osteoporosis     Unspecified glaucoma      Allergies: Penicillins    Nutrition Assessment    Anthropometrics:   Weight:   Wt Readings from Last 10 Encounters:   01/29/25 49 kg (108 lb)   01/03/25 47.6 kg (104 lb 15 oz)   12/26/24 47.6 kg (105 lb)   12/13/24 47.7 kg (105 lb 2.6 oz)   11/07/24 48.6 kg (107 lb 2.3 oz)   10/23/24 47.6 kg (105 lb)   10/16/24 47.6 kg (105 lb)   10/10/24 48 kg (105 lb 13.1 oz)   09/30/24 48.6 kg (107 lb  "1.6 oz)   08/27/24 48.4 kg (106 lb 9.5 oz)                             Ht Readings from Last 1 Encounters:   01/29/25 4' 9" (1.448 m)      BMI Readings from Last 1 Encounters:   01/29/25 23.37 kg/m²     Estimated body surface area is 1.4 meters squared as calculated from the following:    Height as of 1/29/25: 4' 9" (1.448 m).    Weight as of 1/29/25: 49 kg (108 lb).      Appetite: ebbs and flows  Intake: Regular Diet, Lactose Intolerance. Eating 3 small meals daily, mid morning and afternoon snack daily. Drinking Ensure or Boost once daily. Drinking 8 glasses of water daily. Fresh OJ 1 cup daily and Whole Milk.   Breakfast: 2 eggs, butter, and cheese.  Lunch: roasted turkey with potato salad and avocado or turkey with wheat tortilla, freeman, and mozzarella cheese  Snacks: nuts, apple with peanut butter, or sips of Ensure.   Dinner: 4 oz salmon, corn on the hartmann, potato salad, and cucumber with avocado and tomato.    Encounter Notes:  Sharon Garcia is a 79 y.o. female with Malignant neoplasm of endometrium referred by Dr. Alcantara for nutrition assessment and counseling. Patient presents to virtual visit alone. Weight stable between 105-108 lb. Noted that patient tried [employer] as well as Ensure and tolerated both well. Mrs. Garcia had a few questions about frequency of supplement intake and if she should be avoiding any foods or beverages in particular. Current nutrition impact symptoms listed below.     Nutrition Impact Symptoms    [] No nutritional concerns at current  [] Poor Appetite   [] Weight loss   [] Nausea   [] Vomiting   [] Diarrhea   [x] Constipation - prune and Miralax.  [] Early Satiety [] Change in smell   [] Change in taste   [x] Dry Mouth   [] Thick saliva   [] Dysphagia   [] Difficulty chewing  [] Mucositis  [] Indigestion  [x] Gas/Bloating - with dairy  [] Reflux      [x] Fatigue - lightheaded and a little confused.    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      Current Medications:  Current Outpatient Medications   Medication Instructions    acetaminophen (TYLENOL) 1,000 mg, Oral, Every 6 hours, Alternate with ibuprofen every 3 hours as needed for pain.    calcium citrate-vitamin D3 315-200 mg (CITRACAL+D) 315 mg-5 mcg (200 unit) per tablet 1 tablet, 2 times daily    denosumab (PROLIA SUBQ) Inject into the skin.    dexAMETHasone (DECADRON) 8 mg, Oral, Daily, For three days starting on the day after your chemotherapy treatment.    Lactobacillus rhamnosus GG (CULTURELLE) 10 billion cell capsule 1 capsule, Daily    latanoprost 0.005 % ophthalmic solution Place into both eyes.    multivit with minerals/lutein (MULTIVITAMIN 50 PLUS ORAL) Take by mouth.    OLANZapine (ZYPREXA) 5 mg, Oral, Nightly, Take for 4 nights starting on the night of your chemotherapy treatment.    oxyCODONE (ROXICODONE) 5 mg, Oral, Every 4 hours PRN    prochlorperazine (COMPAZINE) 5 mg, Oral, Every 6 hours PRN    zinc gluconate 50 mg, Daily     Labs: Reviewed 1/2/25: alb 3.4     Nutrition Diagnosis    Nutrition Problem: Food and nutrition related knowledge deficit  Etiology (related to): lack of prior need for nutrition education  Signs/Symptoms (as evidenced by): new cancer diagnosis and new start chemo    Nutrition Intervention    Nutrition Prescription  9289-0216 kcals/day 30-35 kcal/kg   48-58 g protein/day 1-1.2 gm/kg  4575-5945 mL fluid/day 1 ml/kcal    Recommendations:   Make food safety a priority during active treatment to prevent exposure to food bourne illnesses.   Grade A or pasteurized honey  Continue current intake of sips of Ensure or [facility] Standard 1.4 (vanilla) throughout the day  Encouraged exercise with treatment.   No need to avoid food group. Sweet okay occasionally.     Materials Provided/Reviewed: none    Nutrition Monitoring and Evaluation    Monitor: energy intake, weight, and diet education needs     Follow up Patient will follow up via portal as needed  with any questions/concerns     Communication to referring provider/care team: Note available in chart.     Consultation Time:  20  Minutes    Esteban DELGADO, , LDN  [facility] in Clinical Nutrition  Board Certified Specialist in Oncology Nutrition   [facility]  [phone]

## 2025-02-04 RX ORDER — PROCHLORPERAZINE EDISYLATE 5 MG/ML
5 INJECTION INTRAMUSCULAR; INTRAVENOUS ONCE AS NEEDED
Status: CANCELLED
Start: 2025-02-04

## 2025-02-04 RX ORDER — SODIUM CHLORIDE 0.9 % (FLUSH) 0.9 %
10 SYRINGE (ML) INJECTION
Status: CANCELLED | OUTPATIENT
Start: 2025-02-04

## 2025-02-04 RX ORDER — EPINEPHRINE 0.3 MG/.3ML
0.3 INJECTION SUBCUTANEOUS ONCE AS NEEDED
Status: CANCELLED | OUTPATIENT
Start: 2025-02-04

## 2025-02-04 RX ORDER — FAMOTIDINE 10 MG/ML
20 INJECTION INTRAVENOUS
Status: CANCELLED | OUTPATIENT
Start: 2025-02-04

## 2025-02-04 RX ORDER — HEPARIN 100 UNIT/ML
500 SYRINGE INTRAVENOUS
Status: CANCELLED | OUTPATIENT
Start: 2025-02-04

## 2025-02-04 RX ORDER — DIPHENHYDRAMINE HYDROCHLORIDE 50 MG/ML
50 INJECTION INTRAMUSCULAR; INTRAVENOUS ONCE AS NEEDED
Status: CANCELLED | OUTPATIENT
Start: 2025-02-04

## 2025-02-04 NOTE — PROGRESS NOTES
The patient location is: Home  The chief complaint leading to consultation is: pre chemo evaluation    Visit type: audiovisual    Face to Face time with patient: 15  25 minutes of total time spent on the encounter, which includes face to face time and non-face to face time preparing to see the patient (eg, review of tests), Obtaining and/or reviewing separately obtained history, Documenting clinical information in the electronic or other health record, Independently interpreting results (not separately reported) and communicating results to the patient/family/caregiver, or Care coordination (not separately reported).         Each patient to whom he or she provides medical services by telemedicine is:  (1) informed of the relationship between the physician and patient and the respective role of any other health care provider with respect to management of the patient; and (2) notified that he or she may decline to receive medical services by telemedicine and may withdraw from such care at any time.    Notes:      Referring Provider:  Wiedemann, Michael A., MD  [address]  [place] [address]   Subjective:      Patient ID: Sharon Garcia is a 79 y.o. female.    Chief Complaint: No chief complaint on file.    Problem List Items Addressed This Visit       Endometrial cancer - Primary    Overview     Diagnosis:  IB uterine serous carcinoma    Surgery:  10/23/24: [facility] BSO SLN Omentectomy. 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 77% MI. Extensive LVSI. 0/2 SLN. Omentum negative. P53 abnormal, pMMR, Her2 3+. Tempus NGS: AKT2, ERBB2, TP53 pathogenic mutations.    Adjuvant therapy:  Carboplatin AUC 5 paclitaxel 175 mg/m2                     HPI Had a poor experience with IV infiltration towards the end of her C2 infusion. She would now like to get a port prior to C3.     Met with Dr. Beltran and MD Faith who recommended VBT in addition to chemotherapy.    Review of Systems    Constitutional:  Negative for chills, fatigue and fever.   Respiratory:  Negative for cough and shortness of breath.    Cardiovascular:  Negative for chest pain.   Gastrointestinal:  Negative for abdominal distention, abdominal pain, constipation and diarrhea.   Genitourinary:  Negative for dysuria, pelvic pain and vaginal bleeding.   Musculoskeletal:  Negative for back pain.   Psychiatric/Behavioral:  Negative for dysphoric mood. The patient is not nervous/anxious.      Past Medical History:   Diagnosis Date    Breast cancer     '91 Left, '93 right; alicia mastectomy, no chemo or radiation; no genetic testing    Lower back pain     Osteoporosis     Unspecified glaucoma       Past Surgical History:   Procedure Laterality Date    LYMPH NODE BIOPSY N/A 10/23/2024    Procedure: BIOPSY, LYMPH NODE;  Surgeon: Bob Quiroz MD;  Location: [facility];  Service: Gynecology Oncology;  Laterality: N/A;    MAPPING, LYMPH NODE, SENTINEL N/A 10/23/2024    Procedure: MAPPING, LYMPH NODE, SENTINEL;  Surgeon: Bob Quiroz MD;  Location: [facility];  Service: Gynecology Oncology;  Laterality: N/A;  correct code# 52958    MASTECTOMY Bilateral     ROBOT-ASSISTED LAPAROSCOPIC ABDOMINAL HYSTERECTOMY USING DA TANYA XI N/A 10/23/2024    Procedure: XI ROBOTIC HYSTERECTOMY;  Surgeon: Bob Quiroz MD;  Location: [facility];  Service: Gynecology Oncology;  Laterality: N/A;  2 hr case/ correct code# 77805    ROBOT-ASSISTED LAPAROSCOPIC OMENTECTOMY USING DA TANYA XI  10/23/2024    Procedure: XI ROBOTIC OMENTECTOMY;  Surgeon: Bob Quiroz MD;  Location: [facility];  Service: Gynecology Oncology;;    ROBOT-ASSISTED LAPAROSCOPIC SALPINGO-OOPHORECTOMY USING DA TANYA XI Bilateral 10/23/2024    Procedure: XI ROBOTIC SALPINGO-OOPHORECTOMY;  Surgeon: Bob Quiroz MD;  Location: [facility];  Service: Gynecology Oncology;  Laterality: Bilateral;  correct code# 51087      Family History   Problem Relation Name Age of Onset     Hypertension Mother      Other (sarcoma) Sister      Ovarian cancer Neg Hx      Uterine cancer Neg Hx      Breast cancer Neg Hx      Colon cancer Neg Hx        Social History     Socioeconomic History    Marital status:         Objective:      There were no vitals filed for this visit.  Deferred due to nature of video visit. Documentation for reference.      Physical Exam  Constitutional:       General: She is not in acute distress.  HENT:      Head: Normocephalic.   Eyes:      Extraocular Movements: Extraocular movements intact.      Conjunctiva/sclera: Conjunctivae normal.   Cardiovascular:      Rate and Rhythm: Normal rate.      Pulses: Normal pulses.   Pulmonary:      Effort: Pulmonary effort is normal. No respiratory distress.      Breath sounds: No wheezing.   Abdominal:      General: There is no distension.      Tenderness: There is no abdominal tenderness. There is no guarding or rebound.      Comments: Robotic incisions well healed.   Genitourinary:     Comments: Deferred      Musculoskeletal:         General: No deformity.   Neurological:      Mental Status: She is alert and oriented to person, place, and time.   Psychiatric:         Mood and Affect: Mood normal.         Behavior: Behavior normal.         Thought Content: Thought content normal.         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 Preoperative imaging without evidence of metastatic disease and CA-125 was normal. Second opinion at [facility] with Dr. Beltran recommended VBT in addition to chemotherapy. Today we discussed the pros and cons of VBT as she is apprehensive about undergoing this.   Surgery: [facility] BSO SLND on 10/23/24.   Pathology: 77% MI,  extensive LVSI. P53 mutated. MMR intact. HER2 IHC 3+  Adjuvant therapy: Carboplatin AUC 5 and paclitaxel 175 mg/m2 (Start date 12/13/24)  We again reviewed the potential side effects of chemotherapy including hematologic effects, alopecia, neuropathy, hypersensitivity reactions, arthralgias, nausea, and fatigue.   Return with CMP, CBC, and MD visit prior to C4. Ok to alternate future visits with Silva.  Refer to Radiation oncology for VBT  Port placement before next infusion.         Visit today is associated with current or anticipated ongoing medical care related to this patient's single serious condition/complex condition: endometrial cancer.     Though this visit took place within the global post op period, counseling today covered issues not related to the surgery, specifically the prognosis of her pathology and the pros and cons of adjuvant treatment including chemotherapy, and monitoring for side effects of chemotherapy.    She is tolerating treatment without unexpected or unmanageable side effects. I will review all relevant labs and diagnostic tests prior to signing chemotherapy orders. Proceed with cycle #3 without modification or dose reduction (pending labs).             Bob Quiroz MD

## 2025-02-05 ENCOUNTER — INFUSION (OUTPATIENT)
Dept: INFUSION THERAPY | Facility: HOSPITAL | Age: 80
End: 2025-02-05
Payer: MEDICARE

## 2025-02-05 ENCOUNTER — CLINICAL SUPPORT (OUTPATIENT)
Dept: HEMATOLOGY/ONCOLOGY | Facility: CLINIC | Age: 80
End: 2025-02-05
Payer: MEDICARE

## 2025-02-05 DIAGNOSIS — Z51.11 ENCOUNTER FOR ANTINEOPLASTIC CHEMOTHERAPY AND IMMUNOTHERAPY: ICD-10-CM

## 2025-02-05 DIAGNOSIS — Z71.3 NUTRITIONAL COUNSELING: Primary | ICD-10-CM

## 2025-02-05 DIAGNOSIS — C54.1 ENDOMETRIAL CANCER: ICD-10-CM

## 2025-02-05 DIAGNOSIS — Z51.12 ENCOUNTER FOR ANTINEOPLASTIC CHEMOTHERAPY AND IMMUNOTHERAPY: ICD-10-CM

## 2025-02-05 DIAGNOSIS — C54.1 MALIGNANT NEOPLASM OF ENDOMETRIUM: ICD-10-CM

## 2025-02-05 DIAGNOSIS — C54.1 ENDOMETRIAL CANCER: Primary | ICD-10-CM

## 2025-02-05 DIAGNOSIS — Z90.710 S/P LAPAROSCOPIC HYSTERECTOMY: ICD-10-CM

## 2025-02-05 LAB
ALBUMIN SERPL BCP-MCNC: 3.6 G/DL (ref 3.5–5.2)
ALP SERPL-CCNC: 67 U/L (ref 40–150)
ALT SERPL W/O P-5'-P-CCNC: 15 U/L (ref 10–44)
ANION GAP SERPL CALC-SCNC: 8 MMOL/L (ref 8–16)
AST SERPL-CCNC: 18 U/L (ref 10–40)
BILIRUB SERPL-MCNC: 0.5 MG/DL (ref 0.1–1)
BUN SERPL-MCNC: 13 MG/DL (ref 8–23)
CALCIUM SERPL-MCNC: 9.3 MG/DL (ref 8.7–10.5)
CANCER AG125 SERPL-ACNC: 15 U/ML (ref 0–30)
CHLORIDE SERPL-SCNC: 104 MMOL/L (ref 95–110)
CO2 SERPL-SCNC: 28 MMOL/L (ref 23–29)
CREAT SERPL-MCNC: 0.7 MG/DL (ref 0.5–1.4)
ERYTHROCYTE [DISTWIDTH] IN BLOOD BY AUTOMATED COUNT: 14.2 % (ref 11.5–14.5)
EST. GFR  (NO RACE VARIABLE): >60 ML/MIN/1.73 M^2
GLUCOSE SERPL-MCNC: 98 MG/DL (ref 70–110)
HCT VFR BLD AUTO: 39.5 % (ref 37–48.5)
HGB BLD-MCNC: 13 G/DL (ref 12–16)
IMM GRANULOCYTES # BLD AUTO: 0.01 K/UL (ref 0–0.04)
MCH RBC QN AUTO: 31.3 PG (ref 27–31)
MCHC RBC AUTO-ENTMCNC: 32.9 G/DL (ref 32–36)
MCV RBC AUTO: 95 FL (ref 82–98)
NEUTROPHILS # BLD AUTO: 3.4 K/UL (ref 1.8–7.7)
PLATELET # BLD AUTO: 231 K/UL (ref 150–450)
PMV BLD AUTO: 10.2 FL (ref 9.2–12.9)
POTASSIUM SERPL-SCNC: 4.3 MMOL/L (ref 3.5–5.1)
PROT SERPL-MCNC: 6.9 G/DL (ref 6–8.4)
RBC # BLD AUTO: 4.15 M/UL (ref 4–5.4)
SODIUM SERPL-SCNC: 140 MMOL/L (ref 136–145)
WBC # BLD AUTO: 5.27 K/UL (ref 3.9–12.7)

## 2025-02-05 PROCEDURE — A4216 STERILE WATER/SALINE, 10 ML: HCPCS | Performed by: STUDENT IN AN ORGANIZED HEALTH CARE EDUCATION/TRAINING PROGRAM

## 2025-02-05 PROCEDURE — 25000003 PHARM REV CODE 250: Performed by: STUDENT IN AN ORGANIZED HEALTH CARE EDUCATION/TRAINING PROGRAM

## 2025-02-05 PROCEDURE — 97803 MED NUTRITION INDIV SUBSEQ: CPT | Mod: 95,,,

## 2025-02-05 PROCEDURE — 36591 DRAW BLOOD OFF VENOUS DEVICE: CPT

## 2025-02-05 PROCEDURE — 80053 COMPREHEN METABOLIC PANEL: CPT | Performed by: STUDENT IN AN ORGANIZED HEALTH CARE EDUCATION/TRAINING PROGRAM

## 2025-02-05 PROCEDURE — 85027 COMPLETE CBC AUTOMATED: CPT | Performed by: STUDENT IN AN ORGANIZED HEALTH CARE EDUCATION/TRAINING PROGRAM

## 2025-02-05 PROCEDURE — 86304 IMMUNOASSAY TUMOR CA 125: CPT | Performed by: STUDENT IN AN ORGANIZED HEALTH CARE EDUCATION/TRAINING PROGRAM

## 2025-02-05 PROCEDURE — 63600175 PHARM REV CODE 636 W HCPCS: Performed by: STUDENT IN AN ORGANIZED HEALTH CARE EDUCATION/TRAINING PROGRAM

## 2025-02-05 RX ORDER — HEPARIN 100 UNIT/ML
500 SYRINGE INTRAVENOUS
Status: DISCONTINUED | OUTPATIENT
Start: 2025-02-05 | End: 2025-02-05 | Stop reason: HOSPADM

## 2025-02-05 RX ORDER — SODIUM CHLORIDE 0.9 % (FLUSH) 0.9 %
10 SYRINGE (ML) INJECTION
Status: DISCONTINUED | OUTPATIENT
Start: 2025-02-05 | End: 2025-02-05 | Stop reason: HOSPADM

## 2025-02-05 RX ORDER — HEPARIN 100 UNIT/ML
500 SYRINGE INTRAVENOUS
Status: CANCELLED | OUTPATIENT
Start: 2025-02-05

## 2025-02-05 RX ORDER — SODIUM CHLORIDE 0.9 % (FLUSH) 0.9 %
10 SYRINGE (ML) INJECTION
Status: CANCELLED | OUTPATIENT
Start: 2025-02-05

## 2025-02-05 RX ADMIN — HEPARIN SODIUM (PORCINE) LOCK FLUSH IV SOLN 100 UNIT/ML 500 UNITS: 100 SOLUTION at 02:02

## 2025-02-05 RX ADMIN — Medication 10 ML: at 02:02

## 2025-02-05 NOTE — NURSING
Implanted port accessed using sterile technique, flushed easily with adequate blood return. Port flushed with NS. Labs drawn. Port deaccessed. Pt tolerated well with no questions or complaints.

## 2025-02-10 ENCOUNTER — TELEPHONE (OUTPATIENT)
Dept: RADIATION ONCOLOGY | Facility: CLINIC | Age: 80
End: 2025-02-10
Payer: MEDICARE

## 2025-02-10 NOTE — TELEPHONE ENCOUNTER
Return call to pt. Asks if she can be changed from in person to a virtual visit for appt that is scheduled on 2/17/25. Per Dr Julio, appt can be changed to a VV, but to schedule it on Tuesday 2/18/25. Pt scheduled on 2/18/25 at 1:00 PM the patient verbalized understanding.

## 2025-02-10 NOTE — TELEPHONE ENCOUNTER
----- Message from Carly sent at 2/10/2025  2:11 PM CST -----  Regarding: Virtual Consulation Request  Appointment  Ms. Radha would like to speak with you regarding her schedule appointment with Dr. Julio schedule for 02/17/25.She is requesting a consultation and prefer it to be conducted virtually. You may reach her at [phone].    Thanks    Carly

## 2025-02-18 ENCOUNTER — OFFICE VISIT (OUTPATIENT)
Dept: RADIATION ONCOLOGY | Facility: CLINIC | Age: 80
End: 2025-02-18
Attending: STUDENT IN AN ORGANIZED HEALTH CARE EDUCATION/TRAINING PROGRAM
Payer: MEDICARE

## 2025-02-18 DIAGNOSIS — C54.1 ENDOMETRIAL CANCER: Primary | ICD-10-CM

## 2025-02-18 DIAGNOSIS — Z90.710 S/P LAPAROSCOPIC HYSTERECTOMY: ICD-10-CM

## 2025-02-18 PROCEDURE — 98002 SYNCH AUDIO-VIDEO NEW MOD 45: CPT | Mod: 95,,, | Performed by: RADIOLOGY

## 2025-02-18 NOTE — PROGRESS NOTES
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  She is currently receiving adjuvant chemotherapy with carbotaxol and has been referred for consideration of vaginal cuff brachytherapy.    The patient presented with PMB.  Endometrial biopsy performed on 24 revealed serous endometrial cancer.    The patient was taken to the operating room on 10/23/2024 for hysterectomy.  Pathology revealed serous carcinoma measuring 3.2 cm in greatest dimension.  The tumor invaded 8.5 mm out of a myometrial thickness of 11 mm.  There was extensive lymphovascular invasion.  Tumor involved the lower uterine segment.  Two sentinel lymph nodes were negative for evidence of metastatic carcinoma.  Two non sentinel lymph nodes were also negative for metastatic carcinoma.    The patient has initiated adjuvant chemotherapy with carbo Taxol.  The patient met with Dr. Beltran at [facility] and was recommended vaginal cuff brachytherapy in addition to the chemotherapy.      FIGO STAGE      +FIGO Stage ( staging for cancer of the endometrium)  +IICm (p53abn): p53 abnormal endometrial carcinoma confined to the uterine corpus with any myometrial invasion, with or without cervical invasi on, and regardless of the degree of LVSI or histological type      +FIGO Modified Classification    [id number] (p53 abnormal)      SPECIAL STUDIES    p53 (performed on prior  biopsy): Mutated (null)    MICROSATELLITE INSTABILITY:  IHC Interpretation : No loss of nuclear expression of MMR proteins: low probability of microsatellite instability   Oncology History   Endometrial cancer   10/10/2024 Initial Diagnosis    Endometrial cancer     10/23/2024 Cancer Staged    Staging form: Corpus Uteri - Carcinoma and Carcinosarcoma, AJCC 8th Edition and FIGO 2023  - Clinical stage from 10/23/2024: FIGO Stage IIC, calculated as Stage IB (cT1b, cN0(sn), cM0, POLE-, MMRd-, p53+)     12/13/2024 -  Chemotherapy    Treatment Summary   Plan Name: OP PACLITAXEL CARBOPLATIN (AUC 5) Q3W  Treatment Goal: Curative  Status: Active  Start Date: 12/13/2024  End Date: 11/15/2025 (Planned)  Provider: Bob Quiroz MD  Chemotherapy: CARBOplatin (PARAPLATIN) 370 mg in 0.9% NaCl 322 mL chemo infusion, 370 mg (100 % of original dose 371 mg), Intravenous, Clinic/HOD 1 time, 3 of 17 cycles  Dose modification:   (original dose 371 mg, Cycle 1),   (original dose 371 mg, Cycle 3),   (original dose 371 mg, Cycle 3, Reason: MD Discretion, Comment: Labs obtained prior to delay for port placement)  Administration: 370 mg (12/13/2024), 370 mg (1/3/2025)  PACLitaxeL (TAXOL) 175 mg/m2 = 246 mg in 0.9% NaCl 500 mL chemo infusion, 175 mg/m2 = 246 mg, Intravenous, Clinic/HOD 1 time, 3 of 17 cycles  Administration: 246 mg (12/13/2024), 246 mg (1/3/2025)          REVIEW OF SYSTEMS:   Review of Systems   Constitutional:  Negative for fever, malaise/fatigue and weight loss.   HENT:  Negative for ear pain, hearing loss, sinus pain and sore throat.    Eyes:  Negative for blurred vision, double vision and pain.   Respiratory:  Negative for cough, hemoptysis, shortness of breath and wheezing.    Cardiovascular:  Negative for chest pain, palpitations and leg swelling.   Gastrointestinal:  Negative for abdominal pain, blood in stool, constipation, diarrhea, heartburn, nausea and vomiting.   Genitourinary:  Negative for dysuria,  frequency, hematuria and urgency.   Musculoskeletal:  Negative for back pain and joint pain.   Skin:  Negative for itching and rash.   Neurological:  Negative for tingling, focal weakness, seizures and headaches.   Psychiatric/Behavioral:  Negative for depression. The patient is not nervous/anxious.        PAST MEDICAL HISTORY:  Past Medical History:   Diagnosis Date    Breast cancer     '91 Left, '93 right; alicia mastectomy, no chemo or radiation; no genetic testing    Lower back pain     Osteoporosis     Unspecified glaucoma        PAST SURGICAL HISTORY:  Past Surgical History:   Procedure Laterality Date    LYMPH NODE BIOPSY N/A 10/23/2024    Procedure: BIOPSY, LYMPH NODE;  Surgeon: Bob Quiroz MD;  Location: [facility];  Service: Gynecology Oncology;  Laterality: N/A;    MAPPING, LYMPH NODE, SENTINEL N/A 10/23/2024    Procedure: MAPPING, LYMPH NODE, SENTINEL;  Surgeon: Bob Quiroz MD;  Location: [facility];  Service: Gynecology Oncology;  Laterality: N/A;  correct code# 34688    MASTECTOMY Bilateral     ROBOT-ASSISTED LAPAROSCOPIC ABDOMINAL HYSTERECTOMY USING DA TANYA XI N/A 10/23/2024    Procedure: XI ROBOTIC HYSTERECTOMY;  Surgeon: Bob Quiroz MD;  Location: [facility];  Service: Gynecology Oncology;  Laterality: N/A;  2 hr case/ correct code# 12234    ROBOT-ASSISTED LAPAROSCOPIC OMENTECTOMY USING DA TANYA XI  10/23/2024    Procedure: XI ROBOTIC OMENTECTOMY;  Surgeon: Bob Quiroz MD;  Location: [facility];  Service: Gynecology Oncology;;    ROBOT-ASSISTED LAPAROSCOPIC SALPINGO-OOPHORECTOMY USING DA TANYA XI Bilateral 10/23/2024    Procedure: XI ROBOTIC SALPINGO-OOPHORECTOMY;  Surgeon: Bob Quiroz MD;  Location: [facility];  Service: Gynecology Oncology;  Laterality: Bilateral;  correct code# 05387       ALLERGIES:   Review of patient's allergies indicates:   Allergen Reactions    Penicillins Hives       MEDICATIONS:  Current Outpatient Medications   Medication Sig     acetaminophen (TYLENOL) 500 MG tablet Take 2 tablets (1,000 mg total) by mouth every 6 (six) hours. Alternate with ibuprofen every 3 hours as needed for pain.    calcium citrate-vitamin D3 315-200 mg (CITRACAL+D) 315 mg-5 mcg (200 unit) per tablet Take 1 tablet by mouth 2 (two) times daily.    denosumab (PROLIA SUBQ) Inject into the skin.    dexAMETHasone (DECADRON) 4 MG Tab Take 2 tablets (8 mg total) by mouth once daily. For three days starting on the day after your chemotherapy treatment.    Lactobacillus rhamnosus GG (CULTURELLE) 10 billion cell capsule Take 1 capsule by mouth once daily.    latanoprost 0.005 % ophthalmic solution Place into both eyes.    multivit with minerals/lutein (MULTIVITAMIN 50 PLUS ORAL) Take by mouth.    OLANZapine (ZYPREXA) 5 MG tablet Take 1 tablet (5 mg total) by mouth nightly. Take for 4 nights starting on the night of your chemotherapy treatment.    oxyCODONE (ROXICODONE) 5 MG immediate release tablet Take 1 tablet (5 mg total) by mouth every 4 (four) hours as needed for Pain.    prochlorperazine (COMPAZINE) 5 MG tablet Take 1 tablet (5 mg total) by mouth every 6 (six) hours as needed for Nausea.    zinc gluconate 50 mg tablet Take 50 mg by mouth once daily.     No current facility-administered medications for this visit.     Facility-Administered Medications Ordered in Other Visits   Medication    0.9% NaCl 250 mL flush bag    alteplase injection 2 mg    diphenhydrAMINE injection 50 mg    EPINEPHrine (EPIPEN) 0.3 mg/0.3 mL pen injection 0.3 mg    heparin, porcine (PF) 100 unit/mL injection flush 500 Units    hydrocortisone sodium succinate injection 100 mg    prochlorperazine injection Soln 5 mg    sodium chloride 0.9% flush 10 mL       SOCIAL HISTORY:  Social History[1]    FAMILY HISTORY:  Family History   Problem Relation Name Age of Onset    Hypertension Mother      Other (sarcoma) Sister      Ovarian cancer Neg Hx      Uterine cancer Neg Hx      Breast cancer Neg Hx      Colon  cancer Neg Hx           PHYSICAL EXAMINATION:  ECO  Physical Exam  Constitutional:       Appearance: Normal appearance. She is normal weight.   HENT:      Head: Normocephalic.   Eyes:      Extraocular Movements: Extraocular movements intact.   Neurological:      Mental Status: She is alert.   Psychiatric:         Mood and Affect: Mood normal.         Behavior: Behavior normal.         Thought Content: Thought content normal.         Judgment: Judgment normal.             ASSESSMENT/PLAN:  Diagnoses and all orders for this visit:    Endometrial cancer    S/P RA-TLH/BSO/SLND      The patient has high risk endometrial cancer and is recommended adjuvant chemotherapy and vaginal cuff brachytherapy.  She will receive a dose of 21 Gy in 3 fractions over 3-4 weeks.    Each patient to whom I provide medical services by telemedicine is:  (1) informed of the relationship between the physician and patient and the respective role of any other health care provider with respect to management of the patient; and (2) notified that they may decline to receive medical services by telemedicine and may withdraw from such care at any time. Patient verbally consented to receive this service via telemedicine.           [1]   Social History  Socioeconomic History    Marital status:    Tobacco Use    Smoking status: Never    Smokeless tobacco: Never   Substance and Sexual Activity    Alcohol use: Yes     Comment: social    Drug use: Never    Sexual activity: Not Currently     Partners: Male     Birth control/protection: None     Social Drivers of Health     Financial Resource Strain: Low Risk  (2025)    Overall Financial Resource Strain (CARDIA)     Difficulty of Paying Living Expenses: Not hard at all   Food Insecurity: No Food Insecurity (2025)    Hunger Vital Sign     Worried About Running Out of Food in the Last Year: Never true     Ran Out of Food in the Last Year: Never true   Physical Activity: Sufficiently Active  (1/7/2025)    Exercise Vital Sign     Days of Exercise per Week: 5 days     Minutes of Exercise per Session: 40 min   Stress: Stress Concern Present (1/7/2025)    [employer] - Occupational Stress Questionnaire     Feeling of Stress : To some extent   Housing Stability: Unknown (1/7/2025)    Housing Stability Vital Sign     Unable to Pay for Housing in the Last Year: No

## 2025-02-19 ENCOUNTER — TELEPHONE (OUTPATIENT)
Dept: RADIATION ONCOLOGY | Facility: CLINIC | Age: 80
End: 2025-02-19
Payer: MEDICARE

## 2025-02-19 NOTE — TELEPHONE ENCOUNTER
----- Message from Carly sent at 2/19/2025  1:59 PM CST -----  Regarding: Change the  time on upcoming schedule appointment  Ms. Garcia would like to speak with you regarding her upcoming scheduled appointment on 02/24/24 with Dr. Julio. You may reach her at [phone].Jennie

## 2025-02-19 NOTE — TELEPHONE ENCOUNTER
Return call to pt. Asks about changing appt to later time. States she could not be able to get to appt on 2/24/25 until approx 9:45 AM. Pt is scheduled to see Dr Julio at 9:30 AM to sign consent after having a virtual visit. Pt to have CT simulation same day at 10:00 AM. Pt will keep appt knowing that she will be a little late.

## 2025-02-20 ENCOUNTER — TELEPHONE (OUTPATIENT)
Dept: GYNECOLOGIC ONCOLOGY | Facility: CLINIC | Age: 80
End: 2025-02-20
Payer: MEDICARE

## 2025-02-21 ENCOUNTER — TELEPHONE (OUTPATIENT)
Dept: RADIATION ONCOLOGY | Facility: CLINIC | Age: 80
End: 2025-02-21
Payer: MEDICARE

## 2025-02-21 NOTE — TELEPHONE ENCOUNTER
----- Message from Carly sent at 2/21/2025 12:59 PM CST -----  Regarding: Patients Questions & Concerns  Pt would like a call back with a few questions about upcoming appts. One of the questions mentioned over the phone was if she will be able to drive alone to the visit or will she need assistance.

## 2025-02-21 NOTE — TELEPHONE ENCOUNTER
Return call to pt. Asks if she can drive herself for upcoming appt with Dr Julio. Pt scheduled for f/u from virtual visit to sign consent for endometrial cancer. Pt to have CT simulation same day. Pt may drive herself for these appts. Pt verbalized understanding.

## 2025-02-24 ENCOUNTER — OFFICE VISIT (OUTPATIENT)
Dept: RADIATION ONCOLOGY | Facility: CLINIC | Age: 80
End: 2025-02-24
Payer: MEDICARE

## 2025-02-24 ENCOUNTER — HOSPITAL ENCOUNTER (OUTPATIENT)
Dept: RADIATION THERAPY | Facility: HOSPITAL | Age: 80
Discharge: HOME | End: 2025-02-24
Payer: MEDICARE

## 2025-02-24 VITALS
BODY MASS INDEX: 24.06 KG/M2 | WEIGHT: 114.63 LBS | RESPIRATION RATE: 16 BRPM | HEART RATE: 95 BPM | TEMPERATURE: 98 F | SYSTOLIC BLOOD PRESSURE: 154 MMHG | DIASTOLIC BLOOD PRESSURE: 90 MMHG | HEIGHT: 58 IN | OXYGEN SATURATION: 97 %

## 2025-02-24 DIAGNOSIS — C54.1 ENDOMETRIAL CANCER: Primary | ICD-10-CM

## 2025-02-24 PROCEDURE — 77263 THER RADIOLOGY TX PLNG CPLX: CPT | Mod: ,,, | Performed by: RADIOLOGY

## 2025-02-24 PROCEDURE — 99024 POSTOP FOLLOW-UP VISIT: CPT | Mod: S$GLB,,, | Performed by: RADIOLOGY

## 2025-02-24 PROCEDURE — 77290 THER RAD SIMULAJ FIELD CPLX: CPT | Mod: TC | Performed by: RADIOLOGY

## 2025-02-24 PROCEDURE — 77334 RADIATION TREATMENT AID(S): CPT | Mod: 26,,, | Performed by: RADIOLOGY

## 2025-02-24 PROCEDURE — 77014 HC CT GUIDANCE RADIATION THERAPY FLDS PLACEMENT: CPT | Mod: TC | Performed by: RADIOLOGY

## 2025-02-24 PROCEDURE — 77290 THER RAD SIMULAJ FIELD CPLX: CPT | Mod: 26,,, | Performed by: RADIOLOGY

## 2025-02-24 PROCEDURE — 77334 RADIATION TREATMENT AID(S): CPT | Mod: TC | Performed by: RADIOLOGY

## 2025-02-24 NOTE — PROGRESS NOTES
The patient presents for follow up visit post virtual consultation.  She has agreed to proceed with vaginal cuff brachytherapy as recommended.  She will receive a dose of 21 Gy in 3 fractions over 3 weeks.  The patient will undergo CT simulation today.  We will start treatment next week.

## 2025-02-27 ENCOUNTER — INFUSION (OUTPATIENT)
Dept: INFUSION THERAPY | Facility: HOSPITAL | Age: 80
End: 2025-02-27
Attending: STUDENT IN AN ORGANIZED HEALTH CARE EDUCATION/TRAINING PROGRAM
Payer: MEDICARE

## 2025-02-27 ENCOUNTER — PATIENT MESSAGE (OUTPATIENT)
Dept: HEMATOLOGY/ONCOLOGY | Facility: CLINIC | Age: 80
End: 2025-02-27
Payer: MEDICARE

## 2025-02-27 ENCOUNTER — OFFICE VISIT (OUTPATIENT)
Dept: GYNECOLOGIC ONCOLOGY | Facility: CLINIC | Age: 80
End: 2025-02-27
Payer: MEDICARE

## 2025-02-27 VITALS
DIASTOLIC BLOOD PRESSURE: 78 MMHG | RESPIRATION RATE: 18 BRPM | HEIGHT: 58 IN | WEIGHT: 110 LBS | SYSTOLIC BLOOD PRESSURE: 148 MMHG | TEMPERATURE: 98 F | HEART RATE: 90 BPM | BODY MASS INDEX: 23.09 KG/M2 | OXYGEN SATURATION: 97 %

## 2025-02-27 DIAGNOSIS — C54.1 MALIGNANT NEOPLASM OF ENDOMETRIUM: ICD-10-CM

## 2025-02-27 DIAGNOSIS — C54.1 ENDOMETRIAL CANCER: Primary | ICD-10-CM

## 2025-02-27 DIAGNOSIS — Z79.899 IMMUNODEFICIENCY DUE TO DRUGS: ICD-10-CM

## 2025-02-27 DIAGNOSIS — Z51.12 ENCOUNTER FOR ANTINEOPLASTIC CHEMOTHERAPY AND IMMUNOTHERAPY: ICD-10-CM

## 2025-02-27 DIAGNOSIS — Z51.11 ENCOUNTER FOR ANTINEOPLASTIC CHEMOTHERAPY AND IMMUNOTHERAPY: ICD-10-CM

## 2025-02-27 DIAGNOSIS — D84.821 IMMUNODEFICIENCY DUE TO DRUGS: ICD-10-CM

## 2025-02-27 LAB
ALBUMIN SERPL BCP-MCNC: 3.6 G/DL (ref 3.5–5.2)
ALP SERPL-CCNC: 68 U/L (ref 40–150)
ALT SERPL W/O P-5'-P-CCNC: 18 U/L (ref 10–44)
ANION GAP SERPL CALC-SCNC: 8 MMOL/L (ref 8–16)
AST SERPL-CCNC: 22 U/L (ref 10–40)
BILIRUB SERPL-MCNC: 0.6 MG/DL (ref 0.1–1)
BUN SERPL-MCNC: 14 MG/DL (ref 8–23)
CALCIUM SERPL-MCNC: 9.1 MG/DL (ref 8.7–10.5)
CANCER AG125 SERPL-ACNC: 19 U/ML (ref 0–30)
CHLORIDE SERPL-SCNC: 103 MMOL/L (ref 95–110)
CO2 SERPL-SCNC: 28 MMOL/L (ref 23–29)
CREAT SERPL-MCNC: 0.7 MG/DL (ref 0.5–1.4)
ERYTHROCYTE [DISTWIDTH] IN BLOOD BY AUTOMATED COUNT: 14.4 % (ref 11.5–14.5)
EST. GFR  (NO RACE VARIABLE): >60 ML/MIN/1.73 M^2
GLUCOSE SERPL-MCNC: 130 MG/DL (ref 70–110)
HCT VFR BLD AUTO: 40.4 % (ref 37–48.5)
HGB BLD-MCNC: 13.3 G/DL (ref 12–16)
IMM GRANULOCYTES # BLD AUTO: 0.02 K/UL (ref 0–0.04)
MCH RBC QN AUTO: 30.9 PG (ref 27–31)
MCHC RBC AUTO-ENTMCNC: 32.9 G/DL (ref 32–36)
MCV RBC AUTO: 94 FL (ref 82–98)
NEUTROPHILS # BLD AUTO: 3.6 K/UL (ref 1.8–7.7)
PLATELET # BLD AUTO: 148 K/UL (ref 150–450)
PMV BLD AUTO: 10.3 FL (ref 9.2–12.9)
POTASSIUM SERPL-SCNC: 4 MMOL/L (ref 3.5–5.1)
PROT SERPL-MCNC: 6.9 G/DL (ref 6–8.4)
RBC # BLD AUTO: 4.31 M/UL (ref 4–5.4)
SODIUM SERPL-SCNC: 139 MMOL/L (ref 136–145)
WBC # BLD AUTO: 5.34 K/UL (ref 3.9–12.7)

## 2025-02-27 PROCEDURE — A4216 STERILE WATER/SALINE, 10 ML: HCPCS | Performed by: STUDENT IN AN ORGANIZED HEALTH CARE EDUCATION/TRAINING PROGRAM

## 2025-02-27 PROCEDURE — 3077F SYST BP >= 140 MM HG: CPT | Mod: CPTII,S$GLB,, | Performed by: STUDENT IN AN ORGANIZED HEALTH CARE EDUCATION/TRAINING PROGRAM

## 2025-02-27 PROCEDURE — 99999 PR PBB SHADOW E&M-EST. PATIENT-LVL IV: CPT | Mod: PBBFAC,,, | Performed by: STUDENT IN AN ORGANIZED HEALTH CARE EDUCATION/TRAINING PROGRAM

## 2025-02-27 PROCEDURE — 25000003 PHARM REV CODE 250: Performed by: STUDENT IN AN ORGANIZED HEALTH CARE EDUCATION/TRAINING PROGRAM

## 2025-02-27 PROCEDURE — 3078F DIAST BP <80 MM HG: CPT | Mod: CPTII,S$GLB,, | Performed by: STUDENT IN AN ORGANIZED HEALTH CARE EDUCATION/TRAINING PROGRAM

## 2025-02-27 PROCEDURE — 1101F PT FALLS ASSESS-DOCD LE1/YR: CPT | Mod: CPTII,S$GLB,, | Performed by: STUDENT IN AN ORGANIZED HEALTH CARE EDUCATION/TRAINING PROGRAM

## 2025-02-27 PROCEDURE — 1126F AMNT PAIN NOTED NONE PRSNT: CPT | Mod: CPTII,S$GLB,, | Performed by: STUDENT IN AN ORGANIZED HEALTH CARE EDUCATION/TRAINING PROGRAM

## 2025-02-27 PROCEDURE — 86304 IMMUNOASSAY TUMOR CA 125: CPT | Performed by: STUDENT IN AN ORGANIZED HEALTH CARE EDUCATION/TRAINING PROGRAM

## 2025-02-27 PROCEDURE — 85027 COMPLETE CBC AUTOMATED: CPT | Performed by: STUDENT IN AN ORGANIZED HEALTH CARE EDUCATION/TRAINING PROGRAM

## 2025-02-27 PROCEDURE — G2211 COMPLEX E/M VISIT ADD ON: HCPCS | Mod: S$GLB,,, | Performed by: STUDENT IN AN ORGANIZED HEALTH CARE EDUCATION/TRAINING PROGRAM

## 2025-02-27 PROCEDURE — 1159F MED LIST DOCD IN RCRD: CPT | Mod: CPTII,S$GLB,, | Performed by: STUDENT IN AN ORGANIZED HEALTH CARE EDUCATION/TRAINING PROGRAM

## 2025-02-27 PROCEDURE — 99215 OFFICE O/P EST HI 40 MIN: CPT | Mod: S$GLB,,, | Performed by: STUDENT IN AN ORGANIZED HEALTH CARE EDUCATION/TRAINING PROGRAM

## 2025-02-27 PROCEDURE — 36591 DRAW BLOOD OFF VENOUS DEVICE: CPT

## 2025-02-27 PROCEDURE — 3288F FALL RISK ASSESSMENT DOCD: CPT | Mod: CPTII,S$GLB,, | Performed by: STUDENT IN AN ORGANIZED HEALTH CARE EDUCATION/TRAINING PROGRAM

## 2025-02-27 PROCEDURE — 80053 COMPREHEN METABOLIC PANEL: CPT | Performed by: STUDENT IN AN ORGANIZED HEALTH CARE EDUCATION/TRAINING PROGRAM

## 2025-02-27 PROCEDURE — 63600175 PHARM REV CODE 636 W HCPCS: Performed by: STUDENT IN AN ORGANIZED HEALTH CARE EDUCATION/TRAINING PROGRAM

## 2025-02-27 RX ORDER — PROCHLORPERAZINE EDISYLATE 5 MG/ML
5 INJECTION INTRAMUSCULAR; INTRAVENOUS ONCE AS NEEDED
Status: CANCELLED
Start: 2025-02-27

## 2025-02-27 RX ORDER — SODIUM CHLORIDE 0.9 % (FLUSH) 0.9 %
10 SYRINGE (ML) INJECTION
Status: CANCELLED | OUTPATIENT
Start: 2025-02-27

## 2025-02-27 RX ORDER — FAMOTIDINE 10 MG/ML
20 INJECTION INTRAVENOUS
Status: CANCELLED | OUTPATIENT
Start: 2025-02-27

## 2025-02-27 RX ORDER — SODIUM CHLORIDE 0.9 % (FLUSH) 0.9 %
10 SYRINGE (ML) INJECTION
Status: DISCONTINUED | OUTPATIENT
Start: 2025-02-27 | End: 2025-02-27 | Stop reason: HOSPADM

## 2025-02-27 RX ORDER — EPINEPHRINE 0.3 MG/.3ML
0.3 INJECTION SUBCUTANEOUS ONCE AS NEEDED
Status: CANCELLED | OUTPATIENT
Start: 2025-02-27

## 2025-02-27 RX ORDER — HEPARIN 100 UNIT/ML
500 SYRINGE INTRAVENOUS
Status: CANCELLED | OUTPATIENT
Start: 2025-02-27

## 2025-02-27 RX ORDER — HEPARIN 100 UNIT/ML
500 SYRINGE INTRAVENOUS
Status: DISCONTINUED | OUTPATIENT
Start: 2025-02-27 | End: 2025-02-27 | Stop reason: HOSPADM

## 2025-02-27 RX ORDER — DIPHENHYDRAMINE HYDROCHLORIDE 50 MG/ML
50 INJECTION INTRAMUSCULAR; INTRAVENOUS ONCE AS NEEDED
Status: CANCELLED | OUTPATIENT
Start: 2025-02-27

## 2025-02-27 RX ORDER — SODIUM CHLORIDE 0.9 % (FLUSH) 0.9 %
10 SYRINGE (ML) INJECTION
OUTPATIENT
Start: 2025-02-27

## 2025-02-27 RX ORDER — HEPARIN 100 UNIT/ML
500 SYRINGE INTRAVENOUS
OUTPATIENT
Start: 2025-02-27

## 2025-02-27 RX ADMIN — Medication 10 ML: at 01:02

## 2025-02-27 RX ADMIN — HEPARIN SODIUM (PORCINE) LOCK FLUSH IV SOLN 100 UNIT/ML 500 UNITS: 100 SOLUTION at 01:02

## 2025-02-27 NOTE — PROGRESS NOTES
Referring Provider:  Wiedemann, Michael A., MD  [address] [place] [address]  [place] [address]   Subjective:      Patient ID: Sharon Garcia is a 79 y.o. female.    Chief Complaint: Chemotherapy (chemo)    Problem List Items Addressed This Visit       Endometrial cancer - Primary    Overview   Diagnosis:  IB uterine serous carcinoma    Surgery:  10/23/24: RA-TLH BSO SLN Omentectomy. 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 77% MI. Extensive LVSI. 0/2 SLN. Omentum negative. P53 abnormal, pMMR, Her2 3+. Tempus NGS: AKT2, ERBB2, TP53 pathogenic mutations.    Adjuvant therapy:  Carboplatin AUC 5 paclitaxel 175 mg/m2  C1D1   C2D1  C3D1  C4D1 2/28/25 planned                       HPI Port now in place. Tolerating treatment well. More fatigue this cycle, but energy level good now.     Review of Systems   Constitutional:  Negative for chills, fatigue and fever.   Respiratory:  Negative for cough and shortness of breath.    Cardiovascular:  Negative for chest pain.   Gastrointestinal:  Negative for abdominal distention, abdominal pain, constipation and diarrhea.   Genitourinary:  Negative for dysuria, pelvic pain and vaginal bleeding.   Musculoskeletal:  Negative for back pain.   Psychiatric/Behavioral:  Negative for dysphoric mood. The patient is not nervous/anxious.      Past Medical History:   Diagnosis Date    Breast cancer     '91 Left, '93 right; alicia mastectomy, no chemo or radiation; no genetic testing    Lower back pain     Osteoporosis     Unspecified glaucoma       Past Surgical History:   Procedure Laterality Date    LYMPH NODE BIOPSY N/A 10/23/2024    Procedure: BIOPSY, LYMPH NODE;  Surgeon: Bob Quiroz MD;  Location: [facility];  Service: Gynecology Oncology;  Laterality: N/A;    MAPPING, LYMPH NODE, SENTINEL N/A 10/23/2024    Procedure: MAPPING, LYMPH NODE, SENTINEL;  Surgeon: Bob Quiroz MD;  Location: [facility] OR;  Service: Gynecology Oncology;  Laterality: N/A;  correct  code# 73907    MASTECTOMY Bilateral     ROBOT-ASSISTED LAPAROSCOPIC ABDOMINAL HYSTERECTOMY USING DA TANYA XI N/A 10/23/2024    Procedure: XI ROBOTIC HYSTERECTOMY;  Surgeon: Bob Quiroz MD;  Location: [facility] OR;  Service: Gynecology Oncology;  Laterality: N/A;  2 hr case/ correct code# 53950    ROBOT-ASSISTED LAPAROSCOPIC OMENTECTOMY USING DA TANYA XI  10/23/2024    Procedure: XI ROBOTIC OMENTECTOMY;  Surgeon: Bob Quiroz MD;  Location: [facility] OR;  Service: Gynecology Oncology;;    ROBOT-ASSISTED LAPAROSCOPIC SALPINGO-OOPHORECTOMY USING DA TANYA XI Bilateral 10/23/2024    Procedure: XI ROBOTIC SALPINGO-OOPHORECTOMY;  Surgeon: Bob Quiroz MD;  Location: [facility] OR;  Service: Gynecology Oncology;  Laterality: Bilateral;  correct code# 59959      Family History   Problem Relation Name Age of Onset    Hypertension Mother      Other (sarcoma) Sister      Ovarian cancer Neg Hx      Uterine cancer Neg Hx      Breast cancer Neg Hx      Colon cancer Neg Hx        Social History     Socioeconomic History    Marital status:         Objective:      Vitals:    02/27/25 1336   BP: (!) 148/78   Pulse: 90   Resp: 18   Temp: 97.9 °F (36.6 °C)          Physical Exam  Constitutional:       General: She is not in acute distress.  HENT:      Head: Normocephalic.   Eyes:      Extraocular Movements: Extraocular movements intact.      Conjunctiva/sclera: Conjunctivae normal.   Cardiovascular:      Rate and Rhythm: Normal rate.      Pulses: Normal pulses.   Pulmonary:      Effort: Pulmonary effort is normal. No respiratory distress.      Breath sounds: No wheezing.   Abdominal:      General: There is no distension.      Tenderness: There is no abdominal tenderness. There is no guarding or rebound.      Comments: Robotic incisions well healed.   Genitourinary:     Comments: Deferred      Musculoskeletal:         General: No deformity.   Neurological:      Mental Status: She is alert and oriented to person, place,  and time.   Psychiatric:         Mood and Affect: Mood normal.         Behavior: Behavior normal.         Thought Content: Thought content normal.         Lab Results   Component Value Date    WBC 5.34 02/27/2025    HGB 13.3 02/27/2025    HCT 40.4 02/27/2025    MCV 94 02/27/2025     (L) 02/27/2025        Assessment:       Endometrial cancer                 Plan:       Stage IB serous carcinoma of the endometrium: (FIGO 2023 stage IIC-aggressive histology with myometrial invasion). Preoperative imaging without evidence of metastatic disease and CA-125 was normal. Second opinion at [facility] with Dr. Beltran recommended VBT in addition to chemotherapy. Starting VBT next week.  Surgery: RA-TLH BSO SLND on 10/23/24.   Pathology: 77% MI, extensive LVSI. P53 mutated. MMR intact. HER2 IHC 3+  Adjuvant therapy: Carboplatin AUC 5 and paclitaxel 175 mg/m2 (Start date 12/13/24)  We again reviewed the potential side effects of chemotherapy including hematologic effects, alopecia, neuropathy, hypersensitivity reactions, arthralgias, nausea, and fatigue.   Return with CMP, CBC, and MD visit prior to C4. Ok to alternate future visits with Silva.        Visit today is associated with current or anticipated ongoing medical care related to this patient's single serious condition/complex condition: endometrial cancer.     Though this visit took place within the global post op period, counseling today covered issues not related to the surgery, specifically the prognosis of her pathology and the pros and cons of adjuvant treatment including chemotherapy, and monitoring for side effects of chemotherapy.    She is tolerating treatment without unexpected or unmanageable side effects. I will review all relevant labs and diagnostic tests prior to signing chemotherapy orders. Proceed with cycle #4 without modification or dose reduction (pending labs).             Bob Quiroz MD

## 2025-02-27 NOTE — Clinical Note
Jerardo, ok to schedule next cycle.  Silva, I forgot to tell her about the plan to alternate visits. I tried calling her after our visit but couldn't get through to her. Would you be able to call her and introduce yourself and let her know you'll be seeing her for C5 and I'll see her for C6?  Thanks, Bob

## 2025-02-28 ENCOUNTER — INFUSION (OUTPATIENT)
Dept: INFUSION THERAPY | Facility: HOSPITAL | Age: 80
End: 2025-02-28
Payer: MEDICARE

## 2025-02-28 ENCOUNTER — TELEPHONE (OUTPATIENT)
Dept: GYNECOLOGIC ONCOLOGY | Facility: CLINIC | Age: 80
End: 2025-02-28
Payer: MEDICARE

## 2025-02-28 VITALS
TEMPERATURE: 98 F | WEIGHT: 109.81 LBS | DIASTOLIC BLOOD PRESSURE: 75 MMHG | SYSTOLIC BLOOD PRESSURE: 137 MMHG | HEART RATE: 97 BPM | RESPIRATION RATE: 18 BRPM | HEIGHT: 58 IN | BODY MASS INDEX: 23.05 KG/M2 | OXYGEN SATURATION: 96 %

## 2025-02-28 DIAGNOSIS — C54.1 ENDOMETRIAL CANCER: Primary | ICD-10-CM

## 2025-02-28 PROCEDURE — 25000003 PHARM REV CODE 250: Performed by: STUDENT IN AN ORGANIZED HEALTH CARE EDUCATION/TRAINING PROGRAM

## 2025-02-28 PROCEDURE — 96375 TX/PRO/DX INJ NEW DRUG ADDON: CPT

## 2025-02-28 PROCEDURE — 96367 TX/PROPH/DG ADDL SEQ IV INF: CPT

## 2025-02-28 PROCEDURE — 96413 CHEMO IV INFUSION 1 HR: CPT

## 2025-02-28 PROCEDURE — 63600175 PHARM REV CODE 636 W HCPCS: Performed by: STUDENT IN AN ORGANIZED HEALTH CARE EDUCATION/TRAINING PROGRAM

## 2025-02-28 PROCEDURE — 96415 CHEMO IV INFUSION ADDL HR: CPT

## 2025-02-28 PROCEDURE — A4216 STERILE WATER/SALINE, 10 ML: HCPCS | Performed by: STUDENT IN AN ORGANIZED HEALTH CARE EDUCATION/TRAINING PROGRAM

## 2025-02-28 PROCEDURE — 96417 CHEMO IV INFUS EACH ADDL SEQ: CPT

## 2025-02-28 RX ORDER — PROCHLORPERAZINE EDISYLATE 5 MG/ML
5 INJECTION INTRAMUSCULAR; INTRAVENOUS ONCE AS NEEDED
Status: DISCONTINUED | OUTPATIENT
Start: 2025-02-28 | End: 2025-02-28 | Stop reason: HOSPADM

## 2025-02-28 RX ORDER — SODIUM CHLORIDE 0.9 % (FLUSH) 0.9 %
10 SYRINGE (ML) INJECTION
Status: DISCONTINUED | OUTPATIENT
Start: 2025-02-28 | End: 2025-02-28 | Stop reason: HOSPADM

## 2025-02-28 RX ORDER — DIPHENHYDRAMINE HYDROCHLORIDE 50 MG/ML
50 INJECTION INTRAMUSCULAR; INTRAVENOUS ONCE AS NEEDED
Status: DISCONTINUED | OUTPATIENT
Start: 2025-02-28 | End: 2025-02-28 | Stop reason: HOSPADM

## 2025-02-28 RX ORDER — HEPARIN 100 UNIT/ML
500 SYRINGE INTRAVENOUS
Status: DISCONTINUED | OUTPATIENT
Start: 2025-02-28 | End: 2025-02-28 | Stop reason: HOSPADM

## 2025-02-28 RX ORDER — EPINEPHRINE 0.3 MG/.3ML
0.3 INJECTION SUBCUTANEOUS ONCE AS NEEDED
Status: DISCONTINUED | OUTPATIENT
Start: 2025-02-28 | End: 2025-02-28 | Stop reason: HOSPADM

## 2025-02-28 RX ORDER — FAMOTIDINE 10 MG/ML
20 INJECTION INTRAVENOUS
Status: COMPLETED | OUTPATIENT
Start: 2025-02-28 | End: 2025-02-28

## 2025-02-28 RX ADMIN — DIPHENHYDRAMINE HYDROCHLORIDE 50 MG: 50 INJECTION INTRAMUSCULAR; INTRAVENOUS at 11:02

## 2025-02-28 RX ADMIN — APREPITANT 130 MG: 130 INJECTION, EMULSION INTRAVENOUS at 11:02

## 2025-02-28 RX ADMIN — SODIUM CHLORIDE: 9 INJECTION, SOLUTION INTRAVENOUS at 11:02

## 2025-02-28 RX ADMIN — DEXAMETHASONE SODIUM PHOSPHATE 0.25 MG: 4 INJECTION, SOLUTION INTRA-ARTICULAR; INTRALESIONAL; INTRAMUSCULAR; INTRAVENOUS; SOFT TISSUE at 11:02

## 2025-02-28 RX ADMIN — FAMOTIDINE 20 MG: 10 INJECTION INTRAVENOUS at 11:02

## 2025-02-28 RX ADMIN — PACLITAXEL 246 MG: 6 INJECTION, SOLUTION INTRAVENOUS at 12:02

## 2025-02-28 RX ADMIN — HEPARIN SODIUM (PORCINE) LOCK FLUSH IV SOLN 100 UNIT/ML 500 UNITS: 100 SOLUTION at 03:02

## 2025-02-28 RX ADMIN — CARBOPLATIN 370 MG: 10 INJECTION INTRAVENOUS at 03:02

## 2025-02-28 RX ADMIN — SODIUM CHLORIDE, PRESERVATIVE FREE 10 ML: 5 INJECTION INTRAVENOUS at 03:02

## 2025-02-28 NOTE — PLAN OF CARE
1601-Pt tolerated Taxol and Carboplatin infusion well. No complaints or complications reported. Vital Signs stable. PAC blood return noted, Heparin locked and PAC needle removed. Pt aware to call provider with any questions or  concerns, aware of upcoming appointments, ambulatory from clinic with steady gait, no distress noted.

## 2025-02-28 NOTE — TELEPHONE ENCOUNTER
LM about below and to contact us for any questions or concerns.  Silva Alcantara, FNP-C, [facility]  Gynecologic Oncology    ----- Message from Bob Quiroz MD sent at 2/27/2025  2:39 PM CST -----  Jerardo, ok to schedule next cycle.    Silva, I forgot to tell her about the plan to alternate visits. I tried calling her after our visit but couldn't get through to her. Would you be able to call her and introduce yourself and let her know you'll be seeing her for C5 and I'll see her for C6?    Thanks,  Bob

## 2025-03-03 ENCOUNTER — TELEPHONE (OUTPATIENT)
Dept: GYNECOLOGIC ONCOLOGY | Facility: CLINIC | Age: 80
End: 2025-03-03
Payer: MEDICARE

## 2025-03-05 ENCOUNTER — TELEPHONE (OUTPATIENT)
Dept: GYNECOLOGIC ONCOLOGY | Facility: CLINIC | Age: 80
End: 2025-03-05
Payer: MEDICARE

## 2025-03-12 ENCOUNTER — PROCEDURE VISIT (OUTPATIENT)
Dept: RADIATION ONCOLOGY | Facility: CLINIC | Age: 80
End: 2025-03-12
Payer: MEDICARE

## 2025-03-12 DIAGNOSIS — C54.1 ENDOMETRIAL CANCER: Primary | ICD-10-CM

## 2025-03-12 NOTE — NURSING
03/12/2025  Nurse: Santino Mansfield    Procedure: Vaginal Cylinder    9:44 AM Patient arrived from Home.  Time out performed.      10:38 AM Positioned on Stretcher in the Frog Leg position. Positioned with Knee Immobilizer by myself and Lidocaine Q-tips placed in vaginal cavity 10 minutes prior to applicator insertion. 3.0 CM vaginal applicator inserted.HDR treatment given with a full bladder.      11:10 AM Discharge instructions reviewed with patient.  Patient verbalized understanding.  Patient discharged to home.

## 2025-03-12 NOTE — PROCEDURES
PATIENT IDENTIFICATION:  Patient Name: Sharon Garcia  MRN: [id number]  : 1945    DIAGNOSIS: Cancer Staging   Endometrial cancer  Staging form: Corpus Uteri - Carcinoma and Carcinosarcoma, AJCC 8th Edition and FIGO   - Clinical stage from 10/23/2024: FIGO Stage IIC, calculated as Stage IB (cT1b, cN0(sn), cM0, POLE-, MMRd-, p53+) - Signed by Bob Quiroz MD on 2024    Endometrial cancer [C54.1]      REFERRING PHYSICIAN: No referring provider defined for this encounter.    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  She is currently receiving adjuvant chemotherapy with carbotaxol and has been referred for consideration of vaginal cuff brachytherapy.     The patient presented with PMB.  Endometrial biopsy performed on 24 revealed serous endometrial cancer.     The patient was taken to the operating room on 10/23/2024 for hysterectomy.  Pathology revealed serous carcinoma measuring 3.2 cm in greatest dimension.  The tumor invaded 8.5 mm out of a myometrial thickness of 11 mm.  There was extensive lymphovascular invasion.  Tumor involved the lower uterine segment.  Two sentinel lymph nodes were negative for evidence of metastatic carcinoma.  Two non sentinel lymph nodes were also negative for metastatic carcinoma.     The patient has initiated adjuvant chemotherapy with carbo Taxol.      PROCEDURE: Intracavitary vaginal cylinder #1     AREA TREATED: Upper 3 cm of vagina     TREATMENT METHOD: Insertion of 3 cm vaginal cylinder     RADIATION: Iridium 192, high dose rate     DEPTH OF CALCULATION: vaginal surface     DOSE: 7 Gy      Time out:   Performed by Santino Mansfield RN     Identifiers: Name and date of birth     The patient was brought to the HDR delivery room and  the vaginal cylinder was placed into the vagina.  The brachytherapy catheter was connected to the cylinder  and the treatment was delivered.  She received the 1 out of 3 planned fractions of HDR vaginal brachytherapy as noted above.  She tolerated the treatment well without any unexpected events.

## 2025-03-14 NOTE — PROGRESS NOTES
The patient location is: home  The chief complaint leading to consultation is: chemotherapy     Visit type: audiovisual     Face to Face time with patient: 20  30 minutes of total time spent on the encounter, which includes face to face time and non-face to face time preparing to see the patient (eg, review of tests), Obtaining and/or reviewing separately obtained history, Documenting clinical information in the electronic or other health record, Independently interpreting results (not separately reported) and communicating results to the patient/family/caregiver, or Care coordination (not separately reported).            Each patient to whom he or she provides medical services by telemedicine is:  (1) informed of the relationship between the physician and patient and the respective role of any other health care provider with respect to management of the patient; and (2) notified that he or she may decline to receive medical services by telemedicine and may withdraw from such care at any time.      Referring Provider:  Wiedemann, Michael A., MD  [address] [address]   Subjective:      Patient ID: Sharon Garcia is a 80 y.o. female.    Chief Complaint: Chemotherapy    Problem List Items Addressed This Visit    None  Visit Diagnoses         History of endometrial cancer    -  Primary      Other specified counseling                         HPI Port in place. Tolerating treatment well. Continued fatigue this cycle, but energy level good now. 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    Review of Systems   Constitutional:  Positive for fatigue. Negative for chills and fever.   Respiratory:  Negative for cough and shortness of breath.    Cardiovascular:  Negative for chest pain.   Gastrointestinal:  Negative for abdominal distention, abdominal pain, constipation and diarrhea.   Genitourinary:  Negative for dysuria, pelvic pain and vaginal bleeding.    Musculoskeletal:  Negative for back pain.   Neurological:  Positive for numbness (R foot).   Psychiatric/Behavioral:  Negative for dysphoric mood. The patient is not nervous/anxious.      Past Medical History:   Diagnosis Date    Breast cancer     '91 Left, '93 right; alicia mastectomy, no chemo or radiation; no genetic testing    Lower back pain     Osteoporosis     Unspecified glaucoma       Past Surgical History:   Procedure Laterality Date    LYMPH NODE BIOPSY N/A 10/23/2024    Procedure: BIOPSY, LYMPH NODE;  Surgeon: Bob Quiroz MD;  Location: [facility];  Service: Gynecology Oncology;  Laterality: N/A;    MAPPING, LYMPH NODE, SENTINEL N/A 10/23/2024    Procedure: MAPPING, LYMPH NODE, SENTINEL;  Surgeon: Bob Quiroz MD;  Location: [facility];  Service: Gynecology Oncology;  Laterality: N/A;  correct code# 04267    MASTECTOMY Bilateral     ROBOT-ASSISTED LAPAROSCOPIC ABDOMINAL HYSTERECTOMY USING DA TANYA XI N/A 10/23/2024    Procedure: XI ROBOTIC HYSTERECTOMY;  Surgeon: Bob Quiroz MD;  Location: [facility];  Service: Gynecology Oncology;  Laterality: N/A;  2 hr case/ correct code# 50701    ROBOT-ASSISTED LAPAROSCOPIC OMENTECTOMY USING DA TANYA XI  10/23/2024    Procedure: XI ROBOTIC OMENTECTOMY;  Surgeon: Bob Quiroz MD;  Location: [facility];  Service: Gynecology Oncology;;    ROBOT-ASSISTED LAPAROSCOPIC SALPINGO-OOPHORECTOMY USING DA TANYA XI Bilateral 10/23/2024    Procedure: XI ROBOTIC SALPINGO-OOPHORECTOMY;  Surgeon: Bob Quiroz MD;  Location: [facility];  Service: Gynecology Oncology;  Laterality: Bilateral;  correct code# 44572      Family History   Problem Relation Name Age of Onset    Hypertension Mother      Other (sarcoma) Sister      Ovarian cancer Neg Hx      Uterine cancer Neg Hx      Breast cancer Neg Hx      Colon cancer Neg Hx        Social History     Socioeconomic History    Marital status:         Objective:      There were no vitals filed for this  visit.         Physical Exam  Constitutional:       General: She is not in acute distress.  HENT:      Head: Normocephalic.   Eyes:      General: No scleral icterus.  Pulmonary:      Effort: Pulmonary effort is normal. No respiratory distress.   Abdominal:      General: There is no distension.   Genitourinary:     Comments:       Musculoskeletal:      Right lower leg: No edema.      Left lower leg: No edema.   Skin:     Coloration: Skin is not pale.      Findings: No erythema or rash.   Neurological:      Mental Status: She is alert and oriented to person, place, and time.   Psychiatric:         Mood and Affect: Mood normal.         Behavior: Behavior normal.         Thought Content: Thought content normal.         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 Preoperative imaging without evidence of metastatic disease and CA-125 was normal. Second opinion at [facility] with Dr. Beltran recommended VBT in addition to chemotherapy. VCBT #2 this week  Surgery: -[facility] BSO SLND on 10/23/24.   Pathology: 77% MI, extensive LVSI. P53 mutated. MMR intact. HER2 IHC 3+  Adjuvant therapy: Carboplatin AUC 5 and paclitaxel 175 mg/m2 (Start date 12/13/24)  We again reviewed the potential side effects of chemotherapy including hematologic effects, alopecia, neuropathy, hypersensitivity reactions, arthralgias, nausea, and fatigue.   Return with CMP, CBC, and MD visit prior to C6. Ok to alternate future visits with Silva.        Visit today is associated with current or anticipated ongoing medical care related to this patient's single serious condition/complex condition: endometrial cancer.     She is  tolerating treatment without unexpected or unmanageable side effects. I will review all relevant labs and diagnostic tests prior to signing chemotherapy orders. Proceed with cycle #5 without modification or dose reduction (pending labs).             Silva Alcantara, NP

## 2025-03-17 ENCOUNTER — OFFICE VISIT (OUTPATIENT)
Dept: GYNECOLOGIC ONCOLOGY | Facility: CLINIC | Age: 80
End: 2025-03-17
Payer: MEDICARE

## 2025-03-17 DIAGNOSIS — Z71.89 OTHER SPECIFIED COUNSELING: ICD-10-CM

## 2025-03-17 DIAGNOSIS — Z85.42 HISTORY OF ENDOMETRIAL CANCER: Primary | ICD-10-CM

## 2025-03-17 PROCEDURE — 1160F RVW MEDS BY RX/DR IN RCRD: CPT | Mod: CPTII,95,, | Performed by: NURSE PRACTITIONER

## 2025-03-17 PROCEDURE — 1159F MED LIST DOCD IN RCRD: CPT | Mod: CPTII,95,, | Performed by: NURSE PRACTITIONER

## 2025-03-17 PROCEDURE — 98006 SYNCH AUDIO-VIDEO EST MOD 30: CPT | Mod: 95,,, | Performed by: NURSE PRACTITIONER

## 2025-03-18 ENCOUNTER — TELEPHONE (OUTPATIENT)
Dept: GYNECOLOGIC ONCOLOGY | Facility: CLINIC | Age: 80
End: 2025-03-18
Payer: MEDICARE

## 2025-03-19 ENCOUNTER — PROCEDURE VISIT (OUTPATIENT)
Dept: RADIATION ONCOLOGY | Facility: CLINIC | Age: 80
End: 2025-03-19
Payer: MEDICARE

## 2025-03-19 DIAGNOSIS — C54.1 ENDOMETRIAL CANCER: Primary | ICD-10-CM

## 2025-03-19 NOTE — PROCEDURES
PATIENT IDENTIFICATION:  Patient Name: Sharon Garcia  MRN: [id number]  : 1945    DIAGNOSIS:  Cancer Staging   Endometrial cancer  Staging form: Corpus Uteri - Carcinoma and Carcinosarcoma, AJCC 8th Edition and FIGO   - Clinical stage from 10/23/2024: FIGO Stage IIC, calculated as Stage IB (cT1b, cN0(sn), cM0, POLE-, MMRd-, p53+) - Signed by Bob Quiroz MD on 2024    Endometrial cancer [C54.1]      REFERRING PHYSICIAN: No referring provider defined for this encounter.    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  She is currently receiving adjuvant chemotherapy with carbotaxol and has been referred for consideration of vaginal cuff brachytherapy.     The patient presented with PMB.  Endometrial biopsy performed on 24 revealed serous endometrial cancer.     The patient was taken to the operating room on 10/23/2024 for hysterectomy.  Pathology revealed serous carcinoma measuring 3.2 cm in greatest dimension.  The tumor invaded 8.5 mm out of a myometrial thickness of 11 mm.  There was extensive lymphovascular invasion.  Tumor involved the lower uterine segment.  Two sentinel lymph nodes were negative for evidence of metastatic carcinoma.  Two non sentinel lymph nodes were also negative for metastatic carcinoma.     The patient has initiated adjuvant chemotherapy with carbo Taxol.      PROCEDURE: Intracavitary vaginal cylinder #2     AREA TREATED: Upper 3 cm of vagina     TREATMENT METHOD: Insertion of 3 cm vaginal cylinder     RADIATION: Iridium 192, high dose rate     DEPTH OF CALCULATION: vaginal surface     DOSE: 7 Gy      Time out:   Performed by Santino Mansfield RN     Identifiers: Name and date of birth     The patient was brought to the HDR delivery room and  the vaginal cylinder was placed into the vagina.  The brachytherapy catheter was connected to the  cylinder and the treatment was delivered.  She received the 2 out of 3 planned fractions of HDR vaginal brachytherapy as noted above.  She tolerated the treatment well without any unexpected events.

## 2025-03-19 NOTE — NURSING
03/19/2025  Nurse: Santino Mansfield    Procedure: Vaginal Cylinder    10:36 AM Patient arrived from Home.  Time out performed.        10:49 AM Positioned on Stretcher in the Frog Leg position. Positioned with Edwin Stirrups by myself and Lidocaine Q-tips placed in vaginal cavity 10 minutes prior to inserting 3.0 CM vaginal applicator. HDR treatment given with a full bladder..      11:18 AM Discharge instructions reviewed with patient.  Patient verbalized understanding.  Patient discharged to home.

## 2025-03-20 ENCOUNTER — TELEPHONE (OUTPATIENT)
Dept: GYNECOLOGIC ONCOLOGY | Facility: CLINIC | Age: 80
End: 2025-03-20
Payer: MEDICARE

## 2025-03-20 ENCOUNTER — INFUSION (OUTPATIENT)
Dept: INFUSION THERAPY | Facility: HOSPITAL | Age: 80
End: 2025-03-20
Attending: STUDENT IN AN ORGANIZED HEALTH CARE EDUCATION/TRAINING PROGRAM
Payer: MEDICARE

## 2025-03-20 DIAGNOSIS — Z51.11 ENCOUNTER FOR ANTINEOPLASTIC CHEMOTHERAPY AND IMMUNOTHERAPY: ICD-10-CM

## 2025-03-20 DIAGNOSIS — C54.1 ENDOMETRIAL CANCER: Primary | ICD-10-CM

## 2025-03-20 DIAGNOSIS — C54.1 MALIGNANT NEOPLASM OF ENDOMETRIUM: ICD-10-CM

## 2025-03-20 DIAGNOSIS — Z51.12 ENCOUNTER FOR ANTINEOPLASTIC CHEMOTHERAPY AND IMMUNOTHERAPY: ICD-10-CM

## 2025-03-20 LAB
ALBUMIN SERPL BCP-MCNC: 3.6 G/DL (ref 3.5–5.2)
ALP SERPL-CCNC: 67 U/L (ref 40–150)
ALT SERPL W/O P-5'-P-CCNC: 19 U/L (ref 10–44)
ANION GAP SERPL CALC-SCNC: 9 MMOL/L (ref 8–16)
AST SERPL-CCNC: 22 U/L (ref 10–40)
BILIRUB SERPL-MCNC: 0.5 MG/DL (ref 0.1–1)
BUN SERPL-MCNC: 11 MG/DL (ref 8–23)
CALCIUM SERPL-MCNC: 8.8 MG/DL (ref 8.7–10.5)
CANCER AG125 SERPL-ACNC: 17 U/ML (ref 0–30)
CHLORIDE SERPL-SCNC: 107 MMOL/L (ref 95–110)
CO2 SERPL-SCNC: 26 MMOL/L (ref 23–29)
CREAT SERPL-MCNC: 0.6 MG/DL (ref 0.5–1.4)
ERYTHROCYTE [DISTWIDTH] IN BLOOD BY AUTOMATED COUNT: 15.2 % (ref 11.5–14.5)
EST. GFR  (NO RACE VARIABLE): >60 ML/MIN/1.73 M^2
GLUCOSE SERPL-MCNC: 81 MG/DL (ref 70–110)
HCT VFR BLD AUTO: 38.9 % (ref 37–48.5)
HGB BLD-MCNC: 12.2 G/DL (ref 12–16)
IMM GRANULOCYTES # BLD AUTO: 0.02 K/UL (ref 0–0.04)
MCH RBC QN AUTO: 30 PG (ref 27–31)
MCHC RBC AUTO-ENTMCNC: 31.4 G/DL (ref 32–36)
MCV RBC AUTO: 96 FL (ref 82–98)
NEUTROPHILS # BLD AUTO: 2.1 K/UL (ref 1.8–7.7)
PLATELET # BLD AUTO: 188 K/UL (ref 150–450)
PMV BLD AUTO: 10.1 FL (ref 9.2–12.9)
POTASSIUM SERPL-SCNC: 4.2 MMOL/L (ref 3.5–5.1)
PROT SERPL-MCNC: 6.6 G/DL (ref 6–8.4)
RBC # BLD AUTO: 4.07 M/UL (ref 4–5.4)
SODIUM SERPL-SCNC: 142 MMOL/L (ref 136–145)
WBC # BLD AUTO: 3.52 K/UL (ref 3.9–12.7)

## 2025-03-20 PROCEDURE — 36415 COLL VENOUS BLD VENIPUNCTURE: CPT | Performed by: STUDENT IN AN ORGANIZED HEALTH CARE EDUCATION/TRAINING PROGRAM

## 2025-03-20 PROCEDURE — 80053 COMPREHEN METABOLIC PANEL: CPT | Performed by: STUDENT IN AN ORGANIZED HEALTH CARE EDUCATION/TRAINING PROGRAM

## 2025-03-20 PROCEDURE — 86304 IMMUNOASSAY TUMOR CA 125: CPT | Performed by: STUDENT IN AN ORGANIZED HEALTH CARE EDUCATION/TRAINING PROGRAM

## 2025-03-20 PROCEDURE — 36591 DRAW BLOOD OFF VENOUS DEVICE: CPT

## 2025-03-20 PROCEDURE — 85027 COMPLETE CBC AUTOMATED: CPT | Performed by: STUDENT IN AN ORGANIZED HEALTH CARE EDUCATION/TRAINING PROGRAM

## 2025-03-20 RX ORDER — HEPARIN 100 UNIT/ML
500 SYRINGE INTRAVENOUS
Status: CANCELLED | OUTPATIENT
Start: 2025-03-20

## 2025-03-20 RX ORDER — SODIUM CHLORIDE 0.9 % (FLUSH) 0.9 %
10 SYRINGE (ML) INJECTION
OUTPATIENT
Start: 2025-03-20

## 2025-03-20 RX ORDER — SODIUM CHLORIDE 0.9 % (FLUSH) 0.9 %
10 SYRINGE (ML) INJECTION
Status: CANCELLED | OUTPATIENT
Start: 2025-03-20

## 2025-03-20 RX ORDER — DIPHENHYDRAMINE HYDROCHLORIDE 50 MG/ML
50 INJECTION, SOLUTION INTRAMUSCULAR; INTRAVENOUS ONCE AS NEEDED
Status: CANCELLED | OUTPATIENT
Start: 2025-03-20

## 2025-03-20 RX ORDER — HEPARIN 100 UNIT/ML
500 SYRINGE INTRAVENOUS
OUTPATIENT
Start: 2025-03-20

## 2025-03-20 RX ORDER — HEPARIN 100 UNIT/ML
500 SYRINGE INTRAVENOUS
Status: DISCONTINUED | OUTPATIENT
Start: 2025-03-20 | End: 2025-03-20 | Stop reason: HOSPADM

## 2025-03-20 RX ORDER — SODIUM CHLORIDE 0.9 % (FLUSH) 0.9 %
10 SYRINGE (ML) INJECTION
Status: DISCONTINUED | OUTPATIENT
Start: 2025-03-20 | End: 2025-03-20 | Stop reason: HOSPADM

## 2025-03-20 RX ORDER — PROCHLORPERAZINE EDISYLATE 5 MG/ML
5 INJECTION INTRAMUSCULAR; INTRAVENOUS ONCE AS NEEDED
Status: CANCELLED
Start: 2025-03-20

## 2025-03-20 RX ORDER — EPINEPHRINE 0.3 MG/.3ML
0.3 INJECTION SUBCUTANEOUS ONCE AS NEEDED
Status: CANCELLED | OUTPATIENT
Start: 2025-03-20

## 2025-03-20 RX ORDER — FAMOTIDINE 10 MG/ML
20 INJECTION, SOLUTION INTRAVENOUS
Status: CANCELLED | OUTPATIENT
Start: 2025-03-20

## 2025-03-21 ENCOUNTER — INFUSION (OUTPATIENT)
Dept: INFUSION THERAPY | Facility: HOSPITAL | Age: 80
End: 2025-03-21
Payer: MEDICARE

## 2025-03-21 VITALS
BODY MASS INDEX: 23.24 KG/M2 | DIASTOLIC BLOOD PRESSURE: 79 MMHG | RESPIRATION RATE: 18 BRPM | SYSTOLIC BLOOD PRESSURE: 124 MMHG | WEIGHT: 110.69 LBS | HEART RATE: 92 BPM | TEMPERATURE: 98 F | HEIGHT: 58 IN

## 2025-03-21 DIAGNOSIS — C54.1 ENDOMETRIAL CANCER: Primary | ICD-10-CM

## 2025-03-21 PROCEDURE — 96375 TX/PRO/DX INJ NEW DRUG ADDON: CPT

## 2025-03-21 PROCEDURE — 63600175 PHARM REV CODE 636 W HCPCS: Performed by: STUDENT IN AN ORGANIZED HEALTH CARE EDUCATION/TRAINING PROGRAM

## 2025-03-21 PROCEDURE — 96413 CHEMO IV INFUSION 1 HR: CPT

## 2025-03-21 PROCEDURE — 25000003 PHARM REV CODE 250: Performed by: STUDENT IN AN ORGANIZED HEALTH CARE EDUCATION/TRAINING PROGRAM

## 2025-03-21 PROCEDURE — 96367 TX/PROPH/DG ADDL SEQ IV INF: CPT

## 2025-03-21 PROCEDURE — 96417 CHEMO IV INFUS EACH ADDL SEQ: CPT

## 2025-03-21 PROCEDURE — A4216 STERILE WATER/SALINE, 10 ML: HCPCS | Performed by: STUDENT IN AN ORGANIZED HEALTH CARE EDUCATION/TRAINING PROGRAM

## 2025-03-21 PROCEDURE — 96415 CHEMO IV INFUSION ADDL HR: CPT

## 2025-03-21 RX ORDER — PROCHLORPERAZINE EDISYLATE 5 MG/ML
5 INJECTION INTRAMUSCULAR; INTRAVENOUS ONCE AS NEEDED
Status: DISCONTINUED | OUTPATIENT
Start: 2025-03-21 | End: 2025-03-21 | Stop reason: HOSPADM

## 2025-03-21 RX ORDER — FAMOTIDINE 10 MG/ML
20 INJECTION, SOLUTION INTRAVENOUS
Status: COMPLETED | OUTPATIENT
Start: 2025-03-21 | End: 2025-03-21

## 2025-03-21 RX ORDER — SODIUM CHLORIDE 0.9 % (FLUSH) 0.9 %
10 SYRINGE (ML) INJECTION
Status: DISCONTINUED | OUTPATIENT
Start: 2025-03-21 | End: 2025-03-21 | Stop reason: HOSPADM

## 2025-03-21 RX ORDER — DIPHENHYDRAMINE HYDROCHLORIDE 50 MG/ML
50 INJECTION, SOLUTION INTRAMUSCULAR; INTRAVENOUS ONCE AS NEEDED
Status: DISCONTINUED | OUTPATIENT
Start: 2025-03-21 | End: 2025-03-21 | Stop reason: HOSPADM

## 2025-03-21 RX ORDER — EPINEPHRINE 0.3 MG/.3ML
0.3 INJECTION SUBCUTANEOUS ONCE AS NEEDED
Status: DISCONTINUED | OUTPATIENT
Start: 2025-03-21 | End: 2025-03-21 | Stop reason: HOSPADM

## 2025-03-21 RX ORDER — HEPARIN 100 UNIT/ML
500 SYRINGE INTRAVENOUS
Status: DISCONTINUED | OUTPATIENT
Start: 2025-03-21 | End: 2025-03-21 | Stop reason: HOSPADM

## 2025-03-21 RX ADMIN — APREPITANT 130 MG: 130 INJECTION, EMULSION INTRAVENOUS at 11:03

## 2025-03-21 RX ADMIN — PACLITAXEL 246 MG: 6 INJECTION, SOLUTION INTRAVENOUS at 12:03

## 2025-03-21 RX ADMIN — SODIUM CHLORIDE: 9 INJECTION, SOLUTION INTRAVENOUS at 11:03

## 2025-03-21 RX ADMIN — CARBOPLATIN 405 MG: 10 INJECTION INTRAVENOUS at 03:03

## 2025-03-21 RX ADMIN — DIPHENHYDRAMINE HYDROCHLORIDE 50 MG: 50 INJECTION INTRAMUSCULAR; INTRAVENOUS at 11:03

## 2025-03-21 RX ADMIN — HEPARIN SODIUM (PORCINE) LOCK FLUSH IV SOLN 100 UNIT/ML 500 UNITS: 100 SOLUTION at 04:03

## 2025-03-21 RX ADMIN — Medication 10 ML: at 04:03

## 2025-03-21 RX ADMIN — FAMOTIDINE 20 MG: 10 INJECTION INTRAVENOUS at 11:03

## 2025-03-21 RX ADMIN — DEXAMETHASONE SODIUM PHOSPHATE 0.25 MG: 4 INJECTION, SOLUTION INTRA-ARTICULAR; INTRALESIONAL; INTRAMUSCULAR; INTRAVENOUS; SOFT TISSUE at 11:03

## 2025-03-21 NOTE — PLAN OF CARE
1614-Patient tolerated treatment well. Port was flushed and heparin locked, then de-accessed. Discharged without complaints or S/S of adverse event.   Instructed to call provider for any questions or concerns.

## 2025-03-21 NOTE — PLAN OF CARE
1100-Labs , hx, and medications reviewed. Assessment completed. Discussed plan of care with patient. Patient in agreement. Chair reclined and warm blanket and snack offered.

## 2025-03-24 ENCOUNTER — TELEPHONE (OUTPATIENT)
Dept: GYNECOLOGIC ONCOLOGY | Facility: CLINIC | Age: 80
End: 2025-03-24
Payer: MEDICARE

## 2025-03-26 ENCOUNTER — PROCEDURE VISIT (OUTPATIENT)
Dept: RADIATION ONCOLOGY | Facility: CLINIC | Age: 80
End: 2025-03-26
Payer: MEDICARE

## 2025-03-26 DIAGNOSIS — Z90.710 S/P LAPAROSCOPIC HYSTERECTOMY: ICD-10-CM

## 2025-03-26 DIAGNOSIS — C54.1 ENDOMETRIAL CANCER: Primary | ICD-10-CM

## 2025-03-26 NOTE — PROCEDURES
PATIENT IDENTIFICATION:  Patient Name: Sharon Garcia  MRN: [id number]  : 1945    DIAGNOSIS:  Cancer Staging   Endometrial cancer  Staging form: Corpus Uteri - Carcinoma and Carcinosarcoma, AJCC 8th Edition and FIGO   - Clinical stage from 10/23/2024: FIGO Stage IIC, calculated as Stage IB (cT1b, cN0(sn), cM0, POLE-, MMRd-, p53+) - Signed by Bob Quiroz MD on 2024    Endometrial cancer [C54.1]      REFERRING PHYSICIAN: No referring provider defined for this encounter.    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  She is currently receiving adjuvant chemotherapy with carbotaxol and has been referred for consideration of vaginal cuff brachytherapy.     The patient presented with PMB.  Endometrial biopsy performed on 24 revealed serous endometrial cancer.     The patient was taken to the operating room on 10/23/2024 for hysterectomy.  Pathology revealed serous carcinoma measuring 3.2 cm in greatest dimension.  The tumor invaded 8.5 mm out of a myometrial thickness of 11 mm.  There was extensive lymphovascular invasion.  Tumor involved the lower uterine segment.  Two sentinel lymph nodes were negative for evidence of metastatic carcinoma.  Two non sentinel lymph nodes were also negative for metastatic carcinoma.     The patient has initiated adjuvant chemotherapy with carbo Taxol.      PROCEDURE: Intracavitary vaginal cylinder #3     AREA TREATED: Upper 3 cm of vagina     TREATMENT METHOD: Insertion of 3 cm vaginal cylinder     RADIATION: Iridium 192, high dose rate     DEPTH OF CALCULATION: vaginal surface     DOSE: 7 Gy      Time out:   Performed by Santino Mansfield RN     Identifiers: Name and date of birth     The patient was brought to the HDR delivery room and  the vaginal cylinder was placed into the vagina.  The brachytherapy catheter was connected to the  cylinder and the treatment was delivered.  She received the 3 out of 3 planned fractions of HDR vaginal brachytherapy as noted above.  She tolerated the treatment well without any unexpected events.

## 2025-03-26 NOTE — NURSING
03/26/2025  Nurse: Santino Mansfield    Procedure: Vaginal Cylinder    10:45 AM Patient arrived from Home.  Time out performed.    10:55 AM Positioned on Stretcher in the Frog Leg position. Positioned with Knee Immobilizer by myself and Lidocaine Q-tips place into vagina 10 minutes prior to vaginal applicator being place. 3.0 CM vaginal applicator.HDR treatment given with full bladder.    11:25 AM Discharge instructions reviewed with patient.  Patient verbalized understanding.  Patient discharged to home with family.

## 2025-04-01 ENCOUNTER — TELEPHONE (OUTPATIENT)
Dept: INFUSION THERAPY | Facility: HOSPITAL | Age: 80
End: 2025-04-01
Payer: MEDICARE

## 2025-04-03 ENCOUNTER — OFFICE VISIT (OUTPATIENT)
Dept: GYNECOLOGIC ONCOLOGY | Facility: CLINIC | Age: 80
End: 2025-04-03
Payer: MEDICARE

## 2025-04-03 VITALS
TEMPERATURE: 98 F | HEIGHT: 57 IN | OXYGEN SATURATION: 97 % | RESPIRATION RATE: 18 BRPM | WEIGHT: 108 LBS | BODY MASS INDEX: 23.3 KG/M2

## 2025-04-03 DIAGNOSIS — C54.1 ENDOMETRIAL CANCER: ICD-10-CM

## 2025-04-03 DIAGNOSIS — C54.1 ENDOMETRIAL CANCER DETERMINED BY UTERINE BIOPSY: Primary | ICD-10-CM

## 2025-04-03 PROCEDURE — 99999 PR PBB SHADOW E&M-EST. PATIENT-LVL III: CPT | Mod: PBBFAC,,, | Performed by: STUDENT IN AN ORGANIZED HEALTH CARE EDUCATION/TRAINING PROGRAM

## 2025-04-03 PROCEDURE — 1101F PT FALLS ASSESS-DOCD LE1/YR: CPT | Mod: CPTII,S$GLB,, | Performed by: STUDENT IN AN ORGANIZED HEALTH CARE EDUCATION/TRAINING PROGRAM

## 2025-04-03 PROCEDURE — 1159F MED LIST DOCD IN RCRD: CPT | Mod: CPTII,S$GLB,, | Performed by: STUDENT IN AN ORGANIZED HEALTH CARE EDUCATION/TRAINING PROGRAM

## 2025-04-03 PROCEDURE — 99215 OFFICE O/P EST HI 40 MIN: CPT | Mod: S$GLB,,, | Performed by: STUDENT IN AN ORGANIZED HEALTH CARE EDUCATION/TRAINING PROGRAM

## 2025-04-03 PROCEDURE — 1126F AMNT PAIN NOTED NONE PRSNT: CPT | Mod: CPTII,S$GLB,, | Performed by: STUDENT IN AN ORGANIZED HEALTH CARE EDUCATION/TRAINING PROGRAM

## 2025-04-03 PROCEDURE — 3288F FALL RISK ASSESSMENT DOCD: CPT | Mod: CPTII,S$GLB,, | Performed by: STUDENT IN AN ORGANIZED HEALTH CARE EDUCATION/TRAINING PROGRAM

## 2025-04-03 PROCEDURE — G2211 COMPLEX E/M VISIT ADD ON: HCPCS | Mod: S$GLB,,, | Performed by: STUDENT IN AN ORGANIZED HEALTH CARE EDUCATION/TRAINING PROGRAM

## 2025-04-03 NOTE — PROGRESS NOTES
Referring Provider:  Wiedemann, Michael A., MD  [address] [place] [address]  [place] [id number]   Subjective:      Patient ID: Sharon Garcia is a 80 y.o. female.    Chief Complaint: Chemotherapy (chemo)    Problem List Items Addressed This Visit       Endometrial cancer    Overview   Diagnosis:  IB uterine serous carcinoma    Surgery:  10/23/24: [facility] BSO SLN Omentectomy. 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 77% MI. Extensive LVSI. 0/2 SLN. Omentum negative. P53 abnormal, pMMR, Her2 3+. [employer] NGS: AKT2, ERBB2, TP53 pathogenic mutations.    Adjuvant therapy:  Carboplatin AUC 5 paclitaxel 175 mg/m2  C1D1   C2D1  C3D1  C4D1 2/28/25  C5D1 3/21/25  C6D1 4/11/25 planned    VBT 21 Gy over 3 fractions 3/12/25-3/26/25              Other Visit Diagnoses         Endometrial cancer determined by uterine biopsy    -  Primary    Relevant Orders    CT Chest Abdomen Pelvis W W/O Contrast Gyn Onc (XPD)                       HPI Port in place. Tolerating treatment well. Continued fatigue this cycle, but energy level good now. 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    Glad that this is her last planned cycle of chemo. Her sister is visiting from [place] and here with her today.    Review of Systems   Constitutional:  Positive for fatigue. Negative for chills and fever.   Respiratory:  Negative for cough and shortness of breath.    Cardiovascular:  Negative for chest pain.   Gastrointestinal:  Negative for abdominal distention, abdominal pain, constipation and diarrhea.   Genitourinary:  Negative for dysuria, pelvic pain and vaginal bleeding.   Musculoskeletal:  Negative for back pain.   Neurological:  Positive for numbness (R foot).   Psychiatric/Behavioral:  Negative for dysphoric mood. The patient is not nervous/anxious.      Past Medical History:   Diagnosis Date    Breast cancer     '91 Left, '93 right; alicia mastectomy, no chemo or radiation; no genetic testing     Lower back pain     Osteoporosis     Unspecified glaucoma       Past Surgical History:   Procedure Laterality Date    LYMPH NODE BIOPSY N/A 10/23/2024    Procedure: BIOPSY, LYMPH NODE;  Surgeon: Bob Quiroz MD;  Location: [facility];  Service: Gynecology Oncology;  Laterality: N/A;    MAPPING, LYMPH NODE, SENTINEL N/A 10/23/2024    Procedure: MAPPING, LYMPH NODE, SENTINEL;  Surgeon: Bob Quiroz MD;  Location: [facility];  Service: Gynecology Oncology;  Laterality: N/A;  correct code# 24087    MASTECTOMY Bilateral     ROBOT-ASSISTED LAPAROSCOPIC ABDOMINAL HYSTERECTOMY USING DA TANYA XI N/A 10/23/2024    Procedure: XI ROBOTIC HYSTERECTOMY;  Surgeon: Bob Quiroz MD;  Location: [facility];  Service: Gynecology Oncology;  Laterality: N/A;  2 hr case/ correct code# 87012    ROBOT-ASSISTED LAPAROSCOPIC OMENTECTOMY USING DA TANAY XI  10/23/2024    Procedure: XI ROBOTIC OMENTECTOMY;  Surgeon: Bob Quiroz MD;  Location: [facility];  Service: Gynecology Oncology;;    ROBOT-ASSISTED LAPAROSCOPIC SALPINGO-OOPHORECTOMY USING DA TANYA XI Bilateral 10/23/2024    Procedure: XI ROBOTIC SALPINGO-OOPHORECTOMY;  Surgeon: Bob Quiroz MD;  Location: [facility];  Service: Gynecology Oncology;  Laterality: Bilateral;  correct code# 86694      Family History   Problem Relation Name Age of Onset    Hypertension Mother      Other (sarcoma) Sister      Ovarian cancer Neg Hx      Uterine cancer Neg Hx      Breast cancer Neg Hx      Colon cancer Neg Hx        Social History     Socioeconomic History    Marital status:         Objective:      Vitals:    04/03/25 1100   Resp: 18   Temp: 97.9 °F (36.6 °C)            Physical Exam  Constitutional:       General: She is not in acute distress.  HENT:      Head: Normocephalic.   Eyes:      General: No scleral icterus.  Pulmonary:      Effort: Pulmonary effort is normal. No respiratory distress.   Abdominal:      General: There is no distension.    Genitourinary:     Comments:       Musculoskeletal:      Right lower leg: No edema.      Left lower leg: No edema.   Skin:     Coloration: Skin is not pale.      Findings: No erythema or rash.   Neurological:      Mental Status: She is alert and oriented to person, place, and time.   Psychiatric:         Mood and Affect: Mood normal.         Behavior: Behavior normal.         Thought Content: Thought content normal.         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 Preoperative imaging without evidence of metastatic disease and CA-125 was normal. Second opinion at [facility] with Dr. Beltran recommended VBT in addition to chemotherapy.   Surgery: [facility] BSO SLND on 10/23/24.   Pathology: 77% MI, extensive LVSI. P53 mutated. MMR intact. HER2 IHC 3+  Adjuvant therapy: Carboplatin AUC 5 and paclitaxel 175 mg/m2 (Start date 12/13/24). VBT 3/12/25-3/25/25  We again reviewed the potential side effects of chemotherapy including hematologic effects, alopecia, neuropathy, hypersensitivity reactions, arthralgias, nausea, and fatigue.   Return with CMP, CBC, and MD visit prior to C6. Ok to alternate future visits with Vesta CRUZ in 1 month for surveillance with post treatment CT scan prior  Reviewed expected surveillance schedule: clinical exams every 3 months for the first 2 years and every 6 months for the first 5 years.        Visit today is associated with current or anticipated ongoing medical care related to this patient's single serious condition/complex condition: endometrial  cancer.     She is tolerating treatment without unexpected or unmanageable side effects. I will review all relevant labs and diagnostic tests prior to signing chemotherapy orders. Proceed with cycle #6 without modification or dose reduction (pending labs).             Bob Quiroz MD

## 2025-04-08 DIAGNOSIS — Z51.12 ENCOUNTER FOR ANTINEOPLASTIC CHEMOTHERAPY AND IMMUNOTHERAPY: ICD-10-CM

## 2025-04-08 DIAGNOSIS — Z51.11 ENCOUNTER FOR ANTINEOPLASTIC CHEMOTHERAPY AND IMMUNOTHERAPY: ICD-10-CM

## 2025-04-08 DIAGNOSIS — C54.1 MALIGNANT NEOPLASM OF ENDOMETRIUM: ICD-10-CM

## 2025-04-09 ENCOUNTER — INFUSION (OUTPATIENT)
Dept: INFUSION THERAPY | Facility: HOSPITAL | Age: 80
End: 2025-04-09
Attending: STUDENT IN AN ORGANIZED HEALTH CARE EDUCATION/TRAINING PROGRAM
Payer: MEDICARE

## 2025-04-09 DIAGNOSIS — Z51.11 ENCOUNTER FOR ANTINEOPLASTIC CHEMOTHERAPY AND IMMUNOTHERAPY: ICD-10-CM

## 2025-04-09 DIAGNOSIS — C54.1 ENDOMETRIAL CANCER: Primary | ICD-10-CM

## 2025-04-09 DIAGNOSIS — Z51.12 ENCOUNTER FOR ANTINEOPLASTIC CHEMOTHERAPY AND IMMUNOTHERAPY: ICD-10-CM

## 2025-04-09 DIAGNOSIS — C54.1 MALIGNANT NEOPLASM OF ENDOMETRIUM: ICD-10-CM

## 2025-04-09 LAB
ABSOLUTE NEUTROPHIL COUNT (OHS): 2.26 K/UL (ref 1.8–7.7)
ALBUMIN SERPL BCP-MCNC: 3.6 G/DL (ref 3.5–5.2)
ALP SERPL-CCNC: 67 UNIT/L (ref 40–150)
ALT SERPL W/O P-5'-P-CCNC: 17 UNIT/L (ref 10–44)
ANION GAP (OHS): 8 MMOL/L (ref 8–16)
AST SERPL-CCNC: 20 UNIT/L (ref 11–45)
BILIRUB SERPL-MCNC: 0.5 MG/DL (ref 0.1–1)
BUN SERPL-MCNC: 13 MG/DL (ref 8–23)
CALCIUM SERPL-MCNC: 9.1 MG/DL (ref 8.7–10.5)
CANCER AG125 SERPL-ACNC: 14 UNIT/ML
CHLORIDE SERPL-SCNC: 106 MMOL/L (ref 95–110)
CO2 SERPL-SCNC: 29 MMOL/L (ref 23–29)
CREAT SERPL-MCNC: 0.6 MG/DL (ref 0.5–1.4)
ERYTHROCYTE [DISTWIDTH] IN BLOOD BY AUTOMATED COUNT: 15.6 % (ref 11.5–14.5)
GFR SERPLBLD CREATININE-BSD FMLA CKD-EPI: >60 ML/MIN/1.73/M2
GLUCOSE SERPL-MCNC: 104 MG/DL (ref 70–110)
HCT VFR BLD AUTO: 35.8 % (ref 37–48.5)
HGB BLD-MCNC: 11.7 GM/DL (ref 12–16)
IMM GRANULOCYTES # BLD AUTO: 0.02 K/UL (ref 0–0.04)
MCH RBC QN AUTO: 31 PG (ref 27–31)
MCHC RBC AUTO-ENTMCNC: 32.7 G/DL (ref 32–36)
MCV RBC AUTO: 95 FL (ref 82–98)
PLATELET # BLD AUTO: 164 K/UL (ref 150–450)
PMV BLD AUTO: 9.8 FL (ref 9.2–12.9)
POTASSIUM SERPL-SCNC: 4.1 MMOL/L (ref 3.5–5.1)
PROT SERPL-MCNC: 6.8 GM/DL (ref 6–8.4)
RBC # BLD AUTO: 3.77 M/UL (ref 4–5.4)
SODIUM SERPL-SCNC: 143 MMOL/L (ref 136–145)
WBC # BLD AUTO: 3.6 K/UL (ref 3.9–12.7)

## 2025-04-09 PROCEDURE — 36415 COLL VENOUS BLD VENIPUNCTURE: CPT

## 2025-04-09 PROCEDURE — 36591 DRAW BLOOD OFF VENOUS DEVICE: CPT

## 2025-04-09 PROCEDURE — 25000003 PHARM REV CODE 250: Performed by: STUDENT IN AN ORGANIZED HEALTH CARE EDUCATION/TRAINING PROGRAM

## 2025-04-09 PROCEDURE — A4216 STERILE WATER/SALINE, 10 ML: HCPCS | Performed by: STUDENT IN AN ORGANIZED HEALTH CARE EDUCATION/TRAINING PROGRAM

## 2025-04-09 PROCEDURE — 63600175 PHARM REV CODE 636 W HCPCS: Performed by: STUDENT IN AN ORGANIZED HEALTH CARE EDUCATION/TRAINING PROGRAM

## 2025-04-09 PROCEDURE — 86304 IMMUNOASSAY TUMOR CA 125: CPT

## 2025-04-09 PROCEDURE — 85027 COMPLETE CBC AUTOMATED: CPT

## 2025-04-09 PROCEDURE — 84520 ASSAY OF UREA NITROGEN: CPT

## 2025-04-09 RX ORDER — SODIUM CHLORIDE 0.9 % (FLUSH) 0.9 %
10 SYRINGE (ML) INJECTION
Status: DISCONTINUED | OUTPATIENT
Start: 2025-04-09 | End: 2025-04-09 | Stop reason: HOSPADM

## 2025-04-09 RX ORDER — FAMOTIDINE 10 MG/ML
20 INJECTION, SOLUTION INTRAVENOUS
Status: CANCELLED | OUTPATIENT
Start: 2025-04-09

## 2025-04-09 RX ORDER — OLANZAPINE 5 MG/1
TABLET ORAL
Qty: 4 TABLET | Refills: 0 | Status: SHIPPED | OUTPATIENT
Start: 2025-04-09

## 2025-04-09 RX ORDER — HEPARIN 100 UNIT/ML
500 SYRINGE INTRAVENOUS
Status: CANCELLED | OUTPATIENT
Start: 2025-04-09

## 2025-04-09 RX ORDER — HEPARIN 100 UNIT/ML
500 SYRINGE INTRAVENOUS
OUTPATIENT
Start: 2025-04-09

## 2025-04-09 RX ORDER — SODIUM CHLORIDE 0.9 % (FLUSH) 0.9 %
10 SYRINGE (ML) INJECTION
OUTPATIENT
Start: 2025-04-09

## 2025-04-09 RX ORDER — EPINEPHRINE 0.3 MG/.3ML
0.3 INJECTION SUBCUTANEOUS ONCE AS NEEDED
Status: CANCELLED | OUTPATIENT
Start: 2025-04-09

## 2025-04-09 RX ORDER — HEPARIN 100 UNIT/ML
500 SYRINGE INTRAVENOUS
Status: DISCONTINUED | OUTPATIENT
Start: 2025-04-09 | End: 2025-04-09 | Stop reason: HOSPADM

## 2025-04-09 RX ORDER — SODIUM CHLORIDE 0.9 % (FLUSH) 0.9 %
10 SYRINGE (ML) INJECTION
Status: CANCELLED | OUTPATIENT
Start: 2025-04-09

## 2025-04-09 RX ORDER — DIPHENHYDRAMINE HYDROCHLORIDE 50 MG/ML
50 INJECTION, SOLUTION INTRAMUSCULAR; INTRAVENOUS ONCE AS NEEDED
Status: CANCELLED | OUTPATIENT
Start: 2025-04-09

## 2025-04-09 RX ORDER — PROCHLORPERAZINE EDISYLATE 5 MG/ML
5 INJECTION INTRAMUSCULAR; INTRAVENOUS ONCE AS NEEDED
Status: CANCELLED
Start: 2025-04-09

## 2025-04-09 RX ADMIN — HEPARIN 500 UNITS: 100 SYRINGE at 11:04

## 2025-04-09 RX ADMIN — Medication 10 ML: at 11:04

## 2025-04-11 ENCOUNTER — INFUSION (OUTPATIENT)
Dept: INFUSION THERAPY | Facility: HOSPITAL | Age: 80
End: 2025-04-11
Payer: MEDICARE

## 2025-04-11 VITALS
HEIGHT: 57 IN | HEART RATE: 89 BPM | DIASTOLIC BLOOD PRESSURE: 73 MMHG | BODY MASS INDEX: 23.51 KG/M2 | WEIGHT: 109 LBS | TEMPERATURE: 98 F | SYSTOLIC BLOOD PRESSURE: 157 MMHG | RESPIRATION RATE: 18 BRPM

## 2025-04-11 DIAGNOSIS — C54.1 ENDOMETRIAL CANCER: Primary | ICD-10-CM

## 2025-04-11 PROCEDURE — 96417 CHEMO IV INFUS EACH ADDL SEQ: CPT

## 2025-04-11 PROCEDURE — 25000003 PHARM REV CODE 250: Performed by: STUDENT IN AN ORGANIZED HEALTH CARE EDUCATION/TRAINING PROGRAM

## 2025-04-11 PROCEDURE — A4216 STERILE WATER/SALINE, 10 ML: HCPCS | Performed by: STUDENT IN AN ORGANIZED HEALTH CARE EDUCATION/TRAINING PROGRAM

## 2025-04-11 PROCEDURE — 63600175 PHARM REV CODE 636 W HCPCS: Performed by: STUDENT IN AN ORGANIZED HEALTH CARE EDUCATION/TRAINING PROGRAM

## 2025-04-11 PROCEDURE — 96375 TX/PRO/DX INJ NEW DRUG ADDON: CPT

## 2025-04-11 PROCEDURE — 96367 TX/PROPH/DG ADDL SEQ IV INF: CPT

## 2025-04-11 PROCEDURE — 96413 CHEMO IV INFUSION 1 HR: CPT

## 2025-04-11 PROCEDURE — 96415 CHEMO IV INFUSION ADDL HR: CPT

## 2025-04-11 RX ORDER — PROCHLORPERAZINE EDISYLATE 5 MG/ML
5 INJECTION INTRAMUSCULAR; INTRAVENOUS ONCE AS NEEDED
Status: DISCONTINUED | OUTPATIENT
Start: 2025-04-11 | End: 2025-04-11 | Stop reason: HOSPADM

## 2025-04-11 RX ORDER — HEPARIN 100 UNIT/ML
500 SYRINGE INTRAVENOUS
Status: DISCONTINUED | OUTPATIENT
Start: 2025-04-11 | End: 2025-04-11 | Stop reason: HOSPADM

## 2025-04-11 RX ORDER — DIPHENHYDRAMINE HYDROCHLORIDE 50 MG/ML
50 INJECTION, SOLUTION INTRAMUSCULAR; INTRAVENOUS ONCE AS NEEDED
Status: DISCONTINUED | OUTPATIENT
Start: 2025-04-11 | End: 2025-04-11 | Stop reason: HOSPADM

## 2025-04-11 RX ORDER — SODIUM CHLORIDE 0.9 % (FLUSH) 0.9 %
10 SYRINGE (ML) INJECTION
Status: DISCONTINUED | OUTPATIENT
Start: 2025-04-11 | End: 2025-04-11 | Stop reason: HOSPADM

## 2025-04-11 RX ORDER — FAMOTIDINE 10 MG/ML
20 INJECTION, SOLUTION INTRAVENOUS
Status: COMPLETED | OUTPATIENT
Start: 2025-04-11 | End: 2025-04-11

## 2025-04-11 RX ORDER — EPINEPHRINE 0.3 MG/.3ML
0.3 INJECTION SUBCUTANEOUS ONCE AS NEEDED
Status: DISCONTINUED | OUTPATIENT
Start: 2025-04-11 | End: 2025-04-11 | Stop reason: HOSPADM

## 2025-04-11 RX ADMIN — PACLITAXEL 246 MG: 6 INJECTION, SOLUTION INTRAVENOUS at 12:04

## 2025-04-11 RX ADMIN — FAMOTIDINE 20 MG: 10 INJECTION INTRAVENOUS at 11:04

## 2025-04-11 RX ADMIN — HEPARIN 500 UNITS: 100 SYRINGE at 04:04

## 2025-04-11 RX ADMIN — DEXAMETHASONE SODIUM PHOSPHATE 0.25 MG: 4 INJECTION, SOLUTION INTRA-ARTICULAR; INTRALESIONAL; INTRAMUSCULAR; INTRAVENOUS; SOFT TISSUE at 12:04

## 2025-04-11 RX ADMIN — DIPHENHYDRAMINE HYDROCHLORIDE 50 MG: 50 INJECTION INTRAMUSCULAR; INTRAVENOUS at 11:04

## 2025-04-11 RX ADMIN — APREPITANT 130 MG: 130 INJECTION, EMULSION INTRAVENOUS at 11:04

## 2025-04-11 RX ADMIN — CARBOPLATIN 405 MG: 10 INJECTION, SOLUTION INTRAVENOUS at 03:04

## 2025-04-11 RX ADMIN — SODIUM CHLORIDE: 9 INJECTION, SOLUTION INTRAVENOUS at 11:04

## 2025-04-11 RX ADMIN — Medication 10 ML: at 04:04

## 2025-04-11 NOTE — PLAN OF CARE
5295-Patient tolerated her last treatment well and rang the bell to celebrate being done. Discharged without complaints or S/S of adverse event.   Instructed to call provider for any questions or concerns.

## 2025-05-12 ENCOUNTER — HOSPITAL ENCOUNTER (OUTPATIENT)
Dept: RADIOLOGY | Facility: HOSPITAL | Age: 80
Discharge: HOME | End: 2025-05-12
Attending: STUDENT IN AN ORGANIZED HEALTH CARE EDUCATION/TRAINING PROGRAM
Payer: MEDICARE

## 2025-05-12 DIAGNOSIS — C54.1 ENDOMETRIAL CANCER DETERMINED BY UTERINE BIOPSY: ICD-10-CM

## 2025-05-12 PROCEDURE — 71270 CT THORAX DX C-/C+: CPT | Mod: TC

## 2025-05-12 PROCEDURE — 74178 CT ABD&PLV WO CNTR FLWD CNTR: CPT | Mod: 26,,, | Performed by: RADIOLOGY

## 2025-05-12 PROCEDURE — A9698 NON-RAD CONTRAST MATERIALNOC: HCPCS | Performed by: STUDENT IN AN ORGANIZED HEALTH CARE EDUCATION/TRAINING PROGRAM

## 2025-05-12 PROCEDURE — 25500020 PHARM REV CODE 255: Performed by: STUDENT IN AN ORGANIZED HEALTH CARE EDUCATION/TRAINING PROGRAM

## 2025-05-12 PROCEDURE — 71270 CT THORAX DX C-/C+: CPT | Mod: 26,,, | Performed by: RADIOLOGY

## 2025-05-12 RX ADMIN — IOHEXOL 75 ML: 350 INJECTION, SOLUTION INTRAVENOUS at 02:05

## 2025-05-12 RX ADMIN — BARIUM SULFATE 450 ML: 20 SUSPENSION ORAL at 02:05

## 2025-05-14 NOTE — PROGRESS NOTES
Referring Provider:  Wiedemann, Michael A., MD  [address] [place] [address]  [place] [id number]   Subjective:      Patient ID: Sharon Garcia is a 80 y.o. female.    Chief Complaint: Follow-up (1 mth follow up )    Problem List Items Addressed This Visit       History of endometrial cancer - Primary    Overview   Diagnosis:  IB uterine serous carcinoma    Surgery:  10/23/24: [facility] BSO SLN Omentectomy. 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 77% MI. Extensive LVSI. 0/2 SLN. Omentum negative. P53 abnormal, pMMR, Her2 3+. Tempus NGS: AKT2, ERBB2, TP53 pathogenic mutations.    Adjuvant therapy:  Carboplatin AUC 5 paclitaxel 175 mg/m2  C1D1   C2D1  C3D1  C4D1 2/28/25  C5D1 3/21/25  C6D1 4/11/25     VBT 21 Gy over 3 fractions 3/12/25-3/26/25                 HPI  Says she feels back up to 95% of her normal self now that chemo is done. Has a great deal of anxiety about the surveillance period.       Review of Systems   Constitutional:  Negative for chills, fatigue and fever.   Respiratory:  Negative for cough and shortness of breath.    Cardiovascular:  Negative for chest pain.   Gastrointestinal:  Negative for abdominal distention, abdominal pain, constipation and diarrhea.   Genitourinary:  Negative for dysuria, pelvic pain and vaginal bleeding.   Musculoskeletal:  Negative for back pain.   Neurological:  Negative for numbness.   Psychiatric/Behavioral:  Negative for dysphoric mood. The patient is not nervous/anxious.      Past Medical History:   Diagnosis Date    Breast cancer     '91 Left, '93 right; alicia mastectomy, no chemo or radiation; no genetic testing    Lower back pain     Osteoporosis     Unspecified glaucoma       Past Surgical History:   Procedure Laterality Date    LYMPH NODE BIOPSY N/A 10/23/2024    Procedure: BIOPSY, LYMPH NODE;  Surgeon: Bob Quiroz MD;  Location: [facility];  Service: Gynecology Oncology;  Laterality: N/A;    MAPPING, LYMPH NODE, SENTINEL N/A 10/23/2024     Procedure: MAPPING, LYMPH NODE, SENTINEL;  Surgeon: Bob Quiroz MD;  Location: [facility] OR;  Service: Gynecology Oncology;  Laterality: N/A;  correct code# 75523    MASTECTOMY Bilateral     ROBOT-ASSISTED LAPAROSCOPIC ABDOMINAL HYSTERECTOMY USING DA TANYA XI N/A 10/23/2024    Procedure: XI ROBOTIC HYSTERECTOMY;  Surgeon: Bob Quiroz MD;  Location: [facility] OR;  Service: Gynecology Oncology;  Laterality: N/A;  2 hr case/ correct code# 51974    ROBOT-ASSISTED LAPAROSCOPIC OMENTECTOMY USING DA TANYA XI  10/23/2024    Procedure: XI ROBOTIC OMENTECTOMY;  Surgeon: Bob Quiroz MD;  Location: [facility] OR;  Service: Gynecology Oncology;;    ROBOT-ASSISTED LAPAROSCOPIC SALPINGO-OOPHORECTOMY USING DA TANYA XI Bilateral 10/23/2024    Procedure: XI ROBOTIC SALPINGO-OOPHORECTOMY;  Surgeon: Bob Quiroz MD;  Location: [facility];  Service: Gynecology Oncology;  Laterality: Bilateral;  correct code# 40157      Family History   Problem Relation Name Age of Onset    Hypertension Mother      Other (sarcoma) Sister      Ovarian cancer Neg Hx      Uterine cancer Neg Hx      Breast cancer Neg Hx      Colon cancer Neg Hx        Social History     Socioeconomic History    Marital status:         Objective:      Vitals:    05/15/25 1403   BP: (!) 142/76   Pulse: 91   Resp: 17   Temp: 97.9 °F (36.6 °C)              Physical Exam  Constitutional:       General: She is not in acute distress.  HENT:      Head: Normocephalic.   Eyes:      General: No scleral icterus.  Pulmonary:      Effort: Pulmonary effort is normal. No respiratory distress.   Abdominal:      General: There is no distension.      Comments: Robotic incisions well healed   Genitourinary:     Comments: External genitalia normal. Vagina normal. Uterus, cervix, and adnexa surgically absent. Vaginal cuff intact. No visible or palpable lesions or masses. A female staff member was present to chaperone during the pelvic exam.        Musculoskeletal:       Right lower leg: No edema.      Left lower leg: No edema.   Skin:     Coloration: Skin is not pale.      Findings: No erythema or rash.   Neurological:      Mental Status: She is alert and oriented to person, place, and time.   Psychiatric:         Mood and Affect: Mood normal.         Behavior: Behavior normal.         Thought Content: Thought content normal.         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 Preoperative imaging without evidence of metastatic disease and CA-125 was normal. Second opinion at [facility] with Dr. Beltran recommended VBT in addition to chemotherapy.   Surgery: -[facility] BSO SLND on 10/23/24.   Pathology: 77% MI, extensive LVSI. P53 mutated. MMR intact. HER2 IHC 3+  Adjuvant therapy: Carboplatin AUC 5 and paclitaxel 175 mg/m2 (12/13/24 - 4/11/25). VBT 3/12/25-3/25/25. End of treatment CT with SANTO.  Reviewed expected surveillance schedule: clinical exams every 3 months for the first 2 years and every 6 months for the first 5 years.  RONC in 3 months with CA-125  Plan for CT CAP every 6 months for the first year.        Visit today is associated with current or anticipated ongoing medical care related to this patient's single serious condition/complex condition: endometrial cancer.     I independently interpreted her CT CAP that shows no evidence of disease. I reviewed her CT report and her CA-125 trend.          Bob Quiroz MD

## 2025-05-15 ENCOUNTER — OFFICE VISIT (OUTPATIENT)
Dept: GYNECOLOGIC ONCOLOGY | Facility: CLINIC | Age: 80
End: 2025-05-15
Payer: MEDICARE

## 2025-05-15 VITALS
SYSTOLIC BLOOD PRESSURE: 142 MMHG | TEMPERATURE: 98 F | HEIGHT: 58 IN | RESPIRATION RATE: 17 BRPM | DIASTOLIC BLOOD PRESSURE: 76 MMHG | WEIGHT: 110 LBS | OXYGEN SATURATION: 97 % | BODY MASS INDEX: 23.09 KG/M2 | HEART RATE: 91 BPM

## 2025-05-15 DIAGNOSIS — Z85.42 HISTORY OF ENDOMETRIAL CANCER: Primary | ICD-10-CM

## 2025-05-15 PROCEDURE — 99999 PR PBB SHADOW E&M-EST. PATIENT-LVL IV: CPT | Mod: PBBFAC,,, | Performed by: STUDENT IN AN ORGANIZED HEALTH CARE EDUCATION/TRAINING PROGRAM

## 2025-06-19 ENCOUNTER — TELEPHONE (OUTPATIENT)
Dept: GYNECOLOGIC ONCOLOGY | Facility: CLINIC | Age: 80
End: 2025-06-19
Payer: MEDICARE

## 2025-06-25 ENCOUNTER — OFFICE VISIT (OUTPATIENT)
Dept: RADIATION ONCOLOGY | Facility: CLINIC | Age: 80
End: 2025-06-25
Payer: MEDICARE

## 2025-06-25 VITALS
OXYGEN SATURATION: 95 % | WEIGHT: 109.38 LBS | DIASTOLIC BLOOD PRESSURE: 81 MMHG | BODY MASS INDEX: 22.85 KG/M2 | HEART RATE: 86 BPM | SYSTOLIC BLOOD PRESSURE: 154 MMHG

## 2025-06-25 DIAGNOSIS — C54.1 ENDOMETRIAL CANCER: Primary | ICD-10-CM

## 2025-06-25 DIAGNOSIS — Z90.710 S/P LAPAROSCOPIC HYSTERECTOMY: ICD-10-CM

## 2025-06-25 PROCEDURE — 99999 PR PBB SHADOW E&M-EST. PATIENT-LVL III: CPT | Mod: PBBFAC,,, | Performed by: RADIOLOGY

## 2025-06-25 NOTE — PROGRESS NOTES
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  She is currently receiving adjuvant chemotherapy with carbotaxol and has been referred for consideration of vaginal cuff brachytherapy.     The patient presented with PMB.  Endometrial biopsy performed on 24 revealed serous endometrial cancer.     The patient was taken to the operating room on 10/23/2024 for hysterectomy.  Pathology revealed serous carcinoma measuring 3.2 cm in greatest dimension.  The tumor invaded 8.5 mm out of a myometrial thickness of 11 mm.  There was extensive lymphovascular invasion.  Tumor involved the lower uterine segment.  Two sentinel lymph nodes were negative for evidence of metastatic carcinoma.  Two non sentinel lymph nodes were also negative for metastatic carcinoma.     The patient has initiated adjuvant chemotherapy with carbo Taxol.  The patient met with Dr. Beltran at [facility] and was recommended vaginal cuff brachytherapy in addition to the chemotherapy.        FIGO STAGE      +FIGO Stage ( staging for cancer of the endometrium)  +IICm (p53abn): p53 abnormal endometrial carcinoma confined to the uterine corpus with any myometrial invasion, with or without cervical invasi on, and regardless of the degree of LVSI or histological type      +FIGO Modified Classification    [id number] (p53 abnormal)      SPECIAL STUDIES    p53 (performed on prior biopsy): Mutated (null)    MICROSATELLITE INSTABILITY:  IHC  Interpretation : No loss of nuclear expression of MMR proteins: low probability of microsatellite instability   Oncology History   History of endometrial cancer   10/10/2024 Initial Diagnosis    Endometrial cancer     10/23/2024 Cancer Staged    Staging form: Corpus Uteri - Carcinoma and Carcinosarcoma, AJCC 8th Edition and FIGO 2023  - Clinical stage from 10/23/2024: FIGO Stage IIC, calculated as Stage IB (cT1b, cN0(sn), cM0, POLE-, MMRd-, p53+)     12/13/2024 -  Chemotherapy    Treatment Summary   Plan Name: OP PACLITAXEL CARBOPLATIN (AUC 5) Q3W  Treatment Goal: Curative  Status: Active  Start Date: 12/13/2024  End Date: 11/15/2025 (Planned)  Provider: Bob Quiroz MD  Chemotherapy: CARBOplatin (PARAPLATIN) 370 mg in 0.9% NaCl 322 mL chemo infusion, 370 mg (100 % of original dose 371 mg), Intravenous, Clinic/HOD 1 time, 6 of 17 cycles  Dose modification:   (original dose 371 mg, Cycle 1),   (original dose 371 mg, Cycle 3),   (original dose 371 mg, Cycle 3, Reason: MD Discretion, Comment: Labs obtained prior to delay for port placement)  Administration: 370 mg (12/13/2024), 370 mg (1/3/2025), 370 mg (2/7/2025), 370 mg (2/28/2025), 405 mg (3/21/2025), 405 mg (4/11/2025)  PACLitaxeL (TAXOL) 175 mg/m2 = 246 mg in 0.9% NaCl 500 mL chemo infusion, 175 mg/m2 = 246 mg, Intravenous, Clinic/HOD 1 time, 6 of 17 cycles  Administration: 246 mg (12/13/2024), 246 mg (1/3/2025), 246 mg (2/7/2025), 246 mg (2/28/2025), 246 mg (3/21/2025), 246 mg (4/11/2025)     3/12/2025 - 3/26/2025 Radiation Therapy    Treating physician: Dr. Roslyn Julio  Total Dose: 21 Gy  Fractions: 3         RADIATION:      HISTORY OF PRESENT ILLNESS: Ms. Garcia returns to clinic today for routine post-radiation follow-up.  Patient reports doing well.  CT c/a/p was negative.  Denies vaginal bleeding.  C/o back pain - epidural planned.        ALLERGIES:   Review of patient's allergies indicates:   Allergen Reactions    Penicillins Hives        MEDICATIONS:  Current Outpatient Medications   Medication Sig    acetaminophen (TYLENOL) 500 MG tablet Take 2 tablets (1,000 mg total) by mouth every 6 (six) hours. Alternate with ibuprofen every 3 hours as needed for pain.    calcium citrate-vitamin D3 315-200 mg (CITRACAL+D) 315 mg-5 mcg (200 unit) per tablet Take 1 tablet by mouth 2 (two) times daily.    denosumab (PROLIA SUBQ) Inject into the skin.    dexAMETHasone (DECADRON) 4 MG Tab Take 2 tablets (8 mg total) by mouth once daily. For three days starting on the day after your chemotherapy treatment.    Lactobacillus rhamnosus GG (CULTURELLE) 10 billion cell capsule Take 1 capsule by mouth once daily.    latanoprost 0.005 % ophthalmic solution Place into both eyes.    multivit with minerals/lutein (MULTIVITAMIN 50 PLUS ORAL) Take by mouth.    OLANZapine (ZYPREXA) 5 MG tablet TAKE 1 TABLET NIGHTLY. TAKE FOR 4 NIGHTS STARTING ON THE NIGHT OF YOUR CHEMOTHERAPY TREATMENT    oxyCODONE (ROXICODONE) 5 MG immediate release tablet Take 1 tablet (5 mg total) by mouth every 4 (four) hours as needed for Pain.    prochlorperazine (COMPAZINE) 5 MG tablet Take 1 tablet (5 mg total) by mouth every 6 (six) hours as needed for Nausea.    zinc gluconate 50 mg tablet Take 50 mg by mouth once daily.     No current facility-administered medications for this visit.     Facility-Administered Medications Ordered in Other Visits   Medication    0.9% NaCl 250 mL flush bag    alteplase injection 2 mg    diphenhydrAMINE injection 50 mg    EPINEPHrine (EPIPEN) 0.3 mg/0.3 mL pen injection 0.3 mg    heparin, porcine (PF) 100 unit/mL injection flush 500 Units    hydrocortisone sodium succinate injection 100 mg    prochlorperazine injection Soln 5 mg    sodium chloride 0.9% flush 10 mL           PHYSICAL EXAMINATION:  Vitals:    25 1124   BP: (!) 154/81   Pulse: 86     ECO  Body mass index is 22.85 kg/m².   Physical Exam  Constitutional:       Appearance: She is well-developed.    HENT:      Head: Normocephalic and atraumatic.   Eyes:      Conjunctiva/sclera: Conjunctivae normal.   Cardiovascular:      Rate and Rhythm: Normal rate.   Pulmonary:      Effort: Pulmonary effort is normal.   Abdominal:      Palpations: Abdomen is soft.   Musculoskeletal:         General: Normal range of motion.      Cervical back: Normal range of motion and neck supple.   Skin:     General: Skin is warm and dry.   Neurological:      Mental Status: She is alert and oriented to person, place, and time.   Psychiatric:         Behavior: Behavior normal.         Thought Content: Thought content normal.          ASSESSMENT/PLAN:  Diagnoses and all orders for this visit:    Endometrial cancer    S/P RA-TLH/BSO/SLND    The patient tolerated VBT well. SANTO  Advised on use of vaginal dilator.  RTC as needed in the future.

## 2025-08-21 ENCOUNTER — LAB VISIT (OUTPATIENT)
Dept: LAB | Facility: HOSPITAL | Age: 80
End: 2025-08-21
Payer: MEDICARE

## 2025-08-21 ENCOUNTER — OFFICE VISIT (OUTPATIENT)
Dept: GYNECOLOGIC ONCOLOGY | Facility: CLINIC | Age: 80
End: 2025-08-21
Payer: MEDICARE

## 2025-08-21 ENCOUNTER — TELEPHONE (OUTPATIENT)
Dept: GYNECOLOGIC ONCOLOGY | Facility: CLINIC | Age: 80
End: 2025-08-21

## 2025-08-21 VITALS
DIASTOLIC BLOOD PRESSURE: 83 MMHG | WEIGHT: 104.25 LBS | HEART RATE: 77 BPM | SYSTOLIC BLOOD PRESSURE: 144 MMHG | BODY MASS INDEX: 21.79 KG/M2 | TEMPERATURE: 98 F

## 2025-08-21 DIAGNOSIS — N89.5 VAGINAL STENOSIS: ICD-10-CM

## 2025-08-21 DIAGNOSIS — Z51.12 ENCOUNTER FOR ANTINEOPLASTIC CHEMOTHERAPY AND IMMUNOTHERAPY: ICD-10-CM

## 2025-08-21 DIAGNOSIS — Z51.11 ENCOUNTER FOR ANTINEOPLASTIC CHEMOTHERAPY AND IMMUNOTHERAPY: ICD-10-CM

## 2025-08-21 DIAGNOSIS — Z85.42 HISTORY OF ENDOMETRIAL CANCER: Primary | ICD-10-CM

## 2025-08-21 DIAGNOSIS — C54.1 MALIGNANT NEOPLASM OF ENDOMETRIUM: ICD-10-CM

## 2025-08-21 LAB
ABSOLUTE NEUTROPHIL COUNT (OHS): 3.53 K/UL (ref 1.8–7.7)
ALBUMIN SERPL BCP-MCNC: 3.9 G/DL (ref 3.5–5.2)
ALP SERPL-CCNC: 59 UNIT/L (ref 40–150)
ALT SERPL W/O P-5'-P-CCNC: 19 UNIT/L (ref 0–55)
ANION GAP (OHS): 9 MMOL/L (ref 8–16)
AST SERPL-CCNC: 24 UNIT/L (ref 0–50)
BILIRUB SERPL-MCNC: 0.7 MG/DL (ref 0.1–1)
BUN SERPL-MCNC: 12 MG/DL (ref 8–23)
CALCIUM SERPL-MCNC: 9.3 MG/DL (ref 8.7–10.5)
CANCER AG125 SERPL-ACNC: 13 UNIT/ML
CHLORIDE SERPL-SCNC: 104 MMOL/L (ref 95–110)
CO2 SERPL-SCNC: 28 MMOL/L (ref 23–29)
CREAT SERPL-MCNC: 0.7 MG/DL (ref 0.5–1.4)
ERYTHROCYTE [DISTWIDTH] IN BLOOD BY AUTOMATED COUNT: 12.8 % (ref 11.5–14.5)
GFR SERPLBLD CREATININE-BSD FMLA CKD-EPI: >60 ML/MIN/1.73/M2
GLUCOSE SERPL-MCNC: 82 MG/DL (ref 70–110)
HCT VFR BLD AUTO: 41.1 % (ref 37–48.5)
HGB BLD-MCNC: 13.3 GM/DL (ref 12–16)
IMM GRANULOCYTES # BLD AUTO: 0.01 K/UL (ref 0–0.04)
MCH RBC QN AUTO: 31.6 PG (ref 27–31)
MCHC RBC AUTO-ENTMCNC: 32.4 G/DL (ref 32–36)
MCV RBC AUTO: 98 FL (ref 82–98)
PLATELET # BLD AUTO: 179 K/UL (ref 150–450)
PMV BLD AUTO: 10.4 FL (ref 9.2–12.9)
POTASSIUM SERPL-SCNC: 4.3 MMOL/L (ref 3.5–5.1)
PROT SERPL-MCNC: 6.8 GM/DL (ref 6–8.4)
RBC # BLD AUTO: 4.21 M/UL (ref 4–5.4)
SODIUM SERPL-SCNC: 141 MMOL/L (ref 136–145)
WBC # BLD AUTO: 5.01 K/UL (ref 3.9–12.7)

## 2025-08-21 PROCEDURE — 36415 COLL VENOUS BLD VENIPUNCTURE: CPT

## 2025-08-21 PROCEDURE — 85027 COMPLETE CBC AUTOMATED: CPT

## 2025-08-21 PROCEDURE — 99999 PR PBB SHADOW E&M-EST. PATIENT-LVL III: CPT | Mod: PBBFAC,,, | Performed by: STUDENT IN AN ORGANIZED HEALTH CARE EDUCATION/TRAINING PROGRAM

## 2025-08-21 PROCEDURE — 82310 ASSAY OF CALCIUM: CPT

## 2025-08-21 PROCEDURE — 86304 IMMUNOASSAY TUMOR CA 125: CPT

## 2025-08-22 ENCOUNTER — PATIENT MESSAGE (OUTPATIENT)
Dept: GYNECOLOGIC ONCOLOGY | Facility: CLINIC | Age: 80
End: 2025-08-22
Payer: MEDICARE

## 2025-09-02 ENCOUNTER — TELEPHONE (OUTPATIENT)
Dept: GYNECOLOGIC ONCOLOGY | Facility: CLINIC | Age: 80
End: 2025-09-02
Payer: MEDICARE

## (undated) DEVICE — POWDER ARISTA AH 3G

## (undated) DEVICE — SEAL CANN UNIVERSAL 5-12MM

## (undated) DEVICE — NDL SPINAL 20GX3.5 HUB

## (undated) DEVICE — NDL INSUFFLATION VERRES 120MM

## (undated) DEVICE — PORT ACCESS 5MM W/120MM

## (undated) DEVICE — SOL ELECTROLUBE ANTI-STIC

## (undated) DEVICE — MANIPULATOR VCARE PLUS 34MM

## (undated) DEVICE — GLOVE SENSICARE PI GRN 7

## (undated) DEVICE — SYS SEE SHARP SCP ANTIFG LNG

## (undated) DEVICE — OBTURATOR BLADELESS 8MM XI CLR

## (undated) DEVICE — GOWN ECLIPSE REINF LV4 XLNG XL

## (undated) DEVICE — KIT WING PAD POSITIONING

## (undated) DEVICE — DRAPE ARM DAVINCI XI

## (undated) DEVICE — SYR 10CC LUER LOCK

## (undated) DEVICE — TRAY DO THE ROBOT

## (undated) DEVICE — SOL IRRI STRL WATER 1000ML

## (undated) DEVICE — SOL NORMAL USPCA 0.9%

## (undated) DEVICE — GLOVE SENSICARE PI GRN 6.5

## (undated) DEVICE — HEMOSTAT SURGICEL NU-KNIT 6X9

## (undated) DEVICE — SOL POVIDONE SCRUB IODINE 4 OZ

## (undated) DEVICE — ELECTRODE REM PLYHSV RETURN 9

## (undated) DEVICE — JELLY SURGILUBE 5GR

## (undated) DEVICE — INSERT CUSHIONPRONE VIEW LARGE

## (undated) DEVICE — GLOVE SENSICARE PI GRN 8

## (undated) DEVICE — GLOVE SENSICARE PI SURG 6.5

## (undated) DEVICE — GLOVE SENSICARE PI SURG 6

## (undated) DEVICE — COVER TIP CURVED SCISSORS XI

## (undated) DEVICE — GLOVE SENSICARE PI SURG 7.5

## (undated) DEVICE — SET TRI-LUMEN FILTERED TUBE

## (undated) DEVICE — TOWEL OR DISP STRL BLUE 4/PK

## (undated) DEVICE — GOWN NONREINF SET-IN SLV 2XL

## (undated) DEVICE — GRASPER EPIX 5X20MM 45CM

## (undated) DEVICE — SYR 50ML CATH TIP

## (undated) DEVICE — DEVICE SNAPSECURE FOL CATH

## (undated) DEVICE — SOL POVIDONE PREP IODINE 4 OZ

## (undated) DEVICE — ADHESIVE DERMABOND ADVANCED

## (undated) DEVICE — SUT V-LOC 90 GS22 2-0 VIO 23CM

## (undated) DEVICE — SUT MCRYL PLUS 4-0 PS2 27IN

## (undated) DEVICE — APPLICATOR ARISTA FLEX XL

## (undated) DEVICE — IRRIGATOR ENDOSCOPY DISP.

## (undated) DEVICE — DRAPE COLUMN DAVINCI XI